# Patient Record
Sex: MALE | Race: BLACK OR AFRICAN AMERICAN | Employment: UNEMPLOYED | URBAN - METROPOLITAN AREA
[De-identification: names, ages, dates, MRNs, and addresses within clinical notes are randomized per-mention and may not be internally consistent; named-entity substitution may affect disease eponyms.]

---

## 2020-05-27 ENCOUNTER — APPOINTMENT (OUTPATIENT)
Dept: GENERAL RADIOLOGY | Age: 65
End: 2020-05-27
Attending: EMERGENCY MEDICINE
Payer: COMMERCIAL

## 2020-05-27 ENCOUNTER — APPOINTMENT (OUTPATIENT)
Dept: CT IMAGING | Age: 65
End: 2020-05-27
Attending: EMERGENCY MEDICINE
Payer: COMMERCIAL

## 2020-05-27 ENCOUNTER — HOSPITAL ENCOUNTER (EMERGENCY)
Age: 65
Discharge: HOME | End: 2020-05-27
Attending: EMERGENCY MEDICINE
Payer: COMMERCIAL

## 2020-05-27 VITALS
HEIGHT: 66 IN | RESPIRATION RATE: 16 BRPM | SYSTOLIC BLOOD PRESSURE: 152 MMHG | WEIGHT: 190 LBS | HEART RATE: 87 BPM | TEMPERATURE: 98.7 F | BODY MASS INDEX: 30.53 KG/M2 | DIASTOLIC BLOOD PRESSURE: 98 MMHG | OXYGEN SATURATION: 96 %
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
## 2020-05-27 DIAGNOSIS — M48.02 CERVICAL SPINAL STENOSIS: ICD-10-CM

## 2020-05-27 DIAGNOSIS — R03.0 ELEVATED BLOOD PRESSURE READING: ICD-10-CM

## 2020-05-27 DIAGNOSIS — M54.50 ACUTE LOW BACK PAIN WITHOUT SCIATICA, UNSPECIFIED BACK PAIN LATERALITY: ICD-10-CM

## 2020-05-27 DIAGNOSIS — M25.512 ACUTE PAIN OF LEFT SHOULDER: ICD-10-CM

## 2020-05-27 DIAGNOSIS — M67.912 TENDINOPATHY OF LEFT ROTATOR CUFF: ICD-10-CM

## 2020-05-27 PROCEDURE — 99284 EMERGENCY DEPT VISIT MOD MDM: CPT

## 2020-05-27 PROCEDURE — 74011250637 HC RX REV CODE- 250/637: Performed by: EMERGENCY MEDICINE

## 2020-05-27 PROCEDURE — 73030 X-RAY EXAM OF SHOULDER: CPT

## 2020-05-27 PROCEDURE — 73010 X-RAY EXAM OF SHOULDER BLADE: CPT

## 2020-05-27 PROCEDURE — 74011250636 HC RX REV CODE- 250/636: Performed by: EMERGENCY MEDICINE

## 2020-05-27 PROCEDURE — 72125 CT NECK SPINE W/O DYE: CPT

## 2020-05-27 PROCEDURE — 96372 THER/PROPH/DIAG INJ SC/IM: CPT

## 2020-05-27 PROCEDURE — 72100 X-RAY EXAM L-S SPINE 2/3 VWS: CPT

## 2020-05-27 RX ORDER — CYCLOBENZAPRINE HCL 10 MG
10 TABLET ORAL
Qty: 20 TAB | Refills: 0 | Status: SHIPPED | OUTPATIENT
Start: 2020-05-27

## 2020-05-27 RX ORDER — NAPROXEN 500 MG/1
500 TABLET ORAL 2 TIMES DAILY WITH MEALS
Qty: 20 TAB | Refills: 0 | Status: SHIPPED | OUTPATIENT
Start: 2020-05-27 | End: 2020-08-31

## 2020-05-27 RX ORDER — OXYCODONE AND ACETAMINOPHEN 5; 325 MG/1; MG/1
2 TABLET ORAL
Status: COMPLETED | OUTPATIENT
Start: 2020-05-27 | End: 2020-05-27

## 2020-05-27 RX ORDER — OXYCODONE AND ACETAMINOPHEN 5; 325 MG/1; MG/1
1 TABLET ORAL
Qty: 10 TAB | Refills: 0 | Status: SHIPPED | OUTPATIENT
Start: 2020-05-27 | End: 2020-06-01

## 2020-05-27 RX ORDER — ATORVASTATIN CALCIUM 40 MG/1
TABLET, FILM COATED ORAL
COMMUNITY
Start: 2020-04-11

## 2020-05-27 RX ORDER — CYCLOBENZAPRINE HCL 10 MG
10 TABLET ORAL
Status: COMPLETED | OUTPATIENT
Start: 2020-05-27 | End: 2020-05-27

## 2020-05-27 RX ORDER — KETOROLAC TROMETHAMINE 30 MG/ML
30 INJECTION, SOLUTION INTRAMUSCULAR; INTRAVENOUS
Status: COMPLETED | OUTPATIENT
Start: 2020-05-27 | End: 2020-05-27

## 2020-05-27 RX ADMIN — KETOROLAC TROMETHAMINE 30 MG: 30 INJECTION, SOLUTION INTRAMUSCULAR at 17:50

## 2020-05-27 RX ADMIN — CYCLOBENZAPRINE HYDROCHLORIDE 10 MG: 10 TABLET, FILM COATED ORAL at 17:50

## 2020-05-27 RX ADMIN — OXYCODONE HYDROCHLORIDE AND ACETAMINOPHEN 2 TABLET: 5; 325 TABLET ORAL at 17:50
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
## 2020-08-31 ENCOUNTER — HOSPITAL ENCOUNTER (OUTPATIENT)
Dept: PREADMISSION TESTING | Age: 65
Discharge: HOME | End: 2020-08-31
Attending: ORTHOPAEDIC SURGERY
Payer: COMMERCIAL

## 2020-08-31 ENCOUNTER — HOSPITAL ENCOUNTER (OUTPATIENT)
Dept: GENERAL RADIOLOGY | Age: 65
Discharge: HOME | End: 2020-08-31
Attending: ORTHOPAEDIC SURGERY
Payer: COMMERCIAL

## 2020-08-31 VITALS
HEIGHT: 65 IN | OXYGEN SATURATION: 97 % | TEMPERATURE: 98.3 F | WEIGHT: 187.83 LBS | BODY MASS INDEX: 31.29 KG/M2 | DIASTOLIC BLOOD PRESSURE: 95 MMHG | HEART RATE: 74 BPM | SYSTOLIC BLOOD PRESSURE: 147 MMHG | RESPIRATION RATE: 16 BRPM

## 2020-08-31 LAB
25(OH)D3 SERPL-MCNC: 19.5 NG/ML (ref 30–100)
ABO + RH BLD: NORMAL
ALBUMIN SERPL-MCNC: 3.9 G/DL (ref 3.5–5)
ALBUMIN/GLOB SERPL: 1.1 {RATIO} (ref 1.1–2.2)
ALP SERPL-CCNC: 86 U/L (ref 45–117)
ALT SERPL-CCNC: 47 U/L (ref 12–78)
ANION GAP SERPL CALC-SCNC: 3 MMOL/L (ref 5–15)
APPEARANCE UR: CLEAR
AST SERPL-CCNC: 44 U/L (ref 15–37)
BACTERIA URNS QL MICRO: NEGATIVE /HPF
BILIRUB SERPL-MCNC: 0.4 MG/DL (ref 0.2–1)
BILIRUB UR QL: NEGATIVE
BLOOD GROUP ANTIBODIES SERPL: NORMAL
BUN SERPL-MCNC: 12 MG/DL (ref 6–20)
BUN/CREAT SERPL: 14 (ref 12–20)
CALCIUM SERPL-MCNC: 9.5 MG/DL (ref 8.5–10.1)
CHLORIDE SERPL-SCNC: 105 MMOL/L (ref 97–108)
CO2 SERPL-SCNC: 30 MMOL/L (ref 21–32)
COLOR UR: NORMAL
CREAT SERPL-MCNC: 0.88 MG/DL (ref 0.7–1.3)
EPITH CASTS URNS QL MICRO: NORMAL /LPF
ERYTHROCYTE [DISTWIDTH] IN BLOOD BY AUTOMATED COUNT: 12.4 % (ref 11.5–14.5)
EST. AVERAGE GLUCOSE BLD GHB EST-MCNC: 117 MG/DL
GLOBULIN SER CALC-MCNC: 3.7 G/DL (ref 2–4)
GLUCOSE SERPL-MCNC: 92 MG/DL (ref 65–100)
GLUCOSE UR STRIP.AUTO-MCNC: NEGATIVE MG/DL
HBA1C MFR BLD: 5.7 % (ref 4–5.6)
HCT VFR BLD AUTO: 43.8 % (ref 36.6–50.3)
HGB BLD-MCNC: 14.3 G/DL (ref 12.1–17)
HGB UR QL STRIP: NEGATIVE
HYALINE CASTS URNS QL MICRO: NORMAL /LPF (ref 0–5)
INR PPP: 0.9 (ref 0.9–1.1)
KETONES UR QL STRIP.AUTO: NEGATIVE MG/DL
LEUKOCYTE ESTERASE UR QL STRIP.AUTO: NEGATIVE
MCH RBC QN AUTO: 30 PG (ref 26–34)
MCHC RBC AUTO-ENTMCNC: 32.6 G/DL (ref 30–36.5)
MCV RBC AUTO: 91.8 FL (ref 80–99)
NITRITE UR QL STRIP.AUTO: NEGATIVE
NRBC # BLD: 0 K/UL (ref 0–0.01)
NRBC BLD-RTO: 0 PER 100 WBC
PH UR STRIP: 6 [PH] (ref 5–8)
PLATELET # BLD AUTO: 330 K/UL (ref 150–400)
PMV BLD AUTO: 10.2 FL (ref 8.9–12.9)
POTASSIUM SERPL-SCNC: 4 MMOL/L (ref 3.5–5.1)
PREALB SERPL-MCNC: 33.8 MG/DL (ref 20–40)
PROT SERPL-MCNC: 7.6 G/DL (ref 6.4–8.2)
PROT UR STRIP-MCNC: NEGATIVE MG/DL
PROTHROMBIN TIME: 9.9 SEC (ref 9–11.1)
RBC # BLD AUTO: 4.77 M/UL (ref 4.1–5.7)
RBC #/AREA URNS HPF: NORMAL /HPF (ref 0–5)
SODIUM SERPL-SCNC: 138 MMOL/L (ref 136–145)
SP GR UR REFRACTOMETRY: 1.02 (ref 1–1.03)
SPECIMEN EXP DATE BLD: NORMAL
UA: UC IF INDICATED,UAUC: NORMAL
UROBILINOGEN UR QL STRIP.AUTO: 1 EU/DL (ref 0.2–1)
WBC # BLD AUTO: 5.7 K/UL (ref 4.1–11.1)
WBC URNS QL MICRO: NORMAL /HPF (ref 0–4)

## 2020-08-31 PROCEDURE — 80053 COMPREHEN METABOLIC PANEL: CPT

## 2020-08-31 PROCEDURE — 36415 COLL VENOUS BLD VENIPUNCTURE: CPT

## 2020-08-31 PROCEDURE — 85027 COMPLETE CBC AUTOMATED: CPT

## 2020-08-31 PROCEDURE — 83036 HEMOGLOBIN GLYCOSYLATED A1C: CPT

## 2020-08-31 PROCEDURE — 71046 X-RAY EXAM CHEST 2 VIEWS: CPT

## 2020-08-31 PROCEDURE — 82306 VITAMIN D 25 HYDROXY: CPT

## 2020-08-31 PROCEDURE — 93005 ELECTROCARDIOGRAM TRACING: CPT

## 2020-08-31 PROCEDURE — 97161 PT EVAL LOW COMPLEX 20 MIN: CPT

## 2020-08-31 PROCEDURE — 85610 PROTHROMBIN TIME: CPT

## 2020-08-31 PROCEDURE — 97116 GAIT TRAINING THERAPY: CPT

## 2020-08-31 PROCEDURE — 81001 URINALYSIS AUTO W/SCOPE: CPT

## 2020-08-31 PROCEDURE — 86900 BLOOD TYPING SEROLOGIC ABO: CPT

## 2020-08-31 PROCEDURE — 84134 ASSAY OF PREALBUMIN: CPT

## 2020-08-31 NOTE — PROGRESS NOTES
[facility]  Physical Therapy Pre-surgery evaluation  [address] [facility] [place], [address] [facility]    PHYSICAL THERAPY PRE SPINE SURGERY EVALUATION  Patient: Severino Davis (67 y.o. male)  Date: 8/31/2020  Primary Diagnosis: PAT  Procedure(s) (LRB):  C3-5 ANTERIOR CERVICAL DISCECTOMY WITH FUSION (CHOICE ANES) (N/A)     Precautions: BLT       ASSESSMENT :  Based on the objective data described below, the patient presents with impaired sensation in B hands and constant neck pain due to end stage degenerative joint disease in the cervical spine. No weakness detected in B UE & LE. Reports only numbness of variable degrees into hands and pain from neck down to mid and lower back intermittently. Discussed anticipated disposition to home with possible discharge within a 1 to 2 day time frame post-surgery. Patient and  in agreement. Anticipate patient will not need acute PT and OT orders based on current deficits post surgery. GOALS: (Goals have been discussed and agreed upon with patient.)  DISCHARGE GOALS: Time Frame: 1 DAY  1. Patient will demonstrate increased strength, range of motion, and pain control via a home exercise program in order to minimize functional deficits in preparation for their upcoming surgery. This will be achieved by using education, demonstration  and through the use of an informational handout including a home exercise program.  REHABILITATION POTENTIAL FOR STATED GOALS: Good     RECOMMENDATIONS AND PLANNED INTERVENTIONS: (Benefits and precautions of physical therapy have been discussed with the patient.)  · Home Exercise Program  TREATMENT PLAN EFFECTIVE DATES: 8/31/2020 to 8/31/2020  FREQUENCY/DURATION: Patient to continue to perform home exercise program at least twice daily until surgery. SUBJECTIVE:   Patient stated my pain is constant in the 1-2s, but increases instantly to 5-7 when I turn my head or look up.     OBJECTIVE DATA SUMMARY: HISTORY:      History reviewed. No pertinent past medical history. Past Surgical History:   Procedure Laterality Date    HX TONSILLECTOMY      5yrs       Prior Level of Function/Home Situation: currently needs intermittent assistance with shoes/socks, otherwise independent. Lives with supportive parents/family. Personal factors and/or comorbidities impacting plan of care:      Home Situation  Home Environment: Private residence  # Steps to Enter: 5  Rails to Enter: Yes  Hand Rails : Bilateral  Wheelchair Ramp: No  One/Two Story Residence: One story  Living Alone: No  Support Systems: Spouse/Significant Other/Partner  Patient Expects to be Discharged to[de-identified] Private residence  Current DME Used/Available at Home: None  Tub or Shower Type: Shower    EXAMINATION/PRESENTATION/DECISION MAKING:     ADLs (Current Functional Status): Bathing/Showering:   [x] Independent  [] Requires Assistance from Someone  [] Sponge Bath Only   Ambulation:  [x] Independent  [x] Walk Indoors Only  [x] Walk Outdoors  [] Use Assistive Device  [] Use Wheelchair Only     Dressing:  [] [address] from Someone for:  [x] Sock/Shoes  [] Pants  [] Everything   Household Activities:  [] Routine house and yard work  [] Light Housework Only  [x] None         Critical Behavior:  Neurologic State: Alert  Orientation Level: Oriented X4          Strength:    Strength:  Within functional limits                        Tone & Sensation:   Tone: Normal              Sensation: Impaired(intermittent numbness/claudication BUE with prolonged neck /)                   Range Of Motion:  AROM: Within functional limits           PROM: Within functional limits           Coordination:  Coordination: Within functional limits    Functional Mobility:  Transfers:  Sit to Stand: Modified independent  Stand to Sit: Modified independent        Bed to Chair: Independent              Balance:   Sitting: Intact  Standing: Intact  Ambulation/Gait Training:  Distance (ft): 150 Feet (ft)  Assistive Device: (none)  Ambulation - Level of Assistance: Independent     Gait Description (WDL): Exceptions to WDL  Gait Abnormalities: (none)  Right Side Weight Bearing: Full  Left Side Weight Bearing: Full        Speed/Elisabeth: Accelerated  Step Length: (normal)                            Functional Measure:  10 Meter walk test:  (Specify if any supplemental oxygen is used, the type, pre, during and post sats.)    Self-Selected Or Fast-Velocity: Self Selected Velocity  Trial 1 (Time to Walk 10 Meters): 8.9 Seconds  Trial 2 (Time to Walk 10 Meters): 9.3 Seconds  Trial 3 (Time to Walk 10 Meters): 8.6 Seconds  Average : 8.9 Seconds  Score (Meters/Second): 1.1 Meters/Second             Walking Speed (m/s)  Modifier Scale Age 52-63 Age 61-76 Age 66-77 Age 80-80    Male Female Male Female Male Female Male Female   CH   0% Impaired ? 1.39 ? 1.40 ? 1.36 ? 1.30 ? 1.33 ? 1.27 ? 1.21 ? 1.15   CI   1-19% Impaired 1.11-1.38 1.12-1.39 1.09-1.35 1.04-1.29 1.06-1.32 1.01-1.26 0.96-1.20 0.92-1.14   CJ   20-39% Impaired 0.83-1.10 0.84-1.11 0.82-1.08 0.78-1.03 0.80-1.05 0.76-1.00 0.72-0.95 0.69-0.91   CK   40-59% Impaired 0.56-0.82 0.57-0.83 0.54-0.81 0.52-0.77 0.53-0.79 0.51-0.75 0.48-0.71 0.46-0.68   CL   60-79% Impaired 0.28-0.55 0.28-0.56 0.27-0.53 0.26-0.51 0.27-0.52 0.25-0.50 0.24-0.49 0.23-0.45   CM   80-99% Impaired 0.01-0.28 < 0.01-0.28 < 0.01-0.27 < 0.01-0.26 0.01-0.27 0.01-0.24 0.01-0.23 0.01-0.22   CN   100% Impaired Cannot Perform   Minimal Detectable Change (MDC-90) = 0.1 m/s  Janay DUNNE \"Comfortable and maximum walking speed of adults aged 20-79 years: reference values and determinants. \" Age and Agin Volume 26(1):15-9. Lor Song. \"Age- and gender-related test performance in community-dwelling elderly people: Six-Minute Walk Test, Roa Balance Scale, Timed Up & Go Test, and gait speeds. \" Physical Therapy: 2002 Volume 82(2):128-37.   Rene EDWARDS, Trisha ALEXANDER, Leisa Barcenas JD, [employer] WANDA. \"Assessing stability and change of four performance measures: a longitudinal study evaluating outcome following total hip and knee arthroplasty. \" [facility] Musculoskeletal Disorders: 2005 Volume 6(3). Ros Salas, PhD; Adalgisa Parada, PhD. Mark Gil Paper: \"Walking Speed: the Sixth Vital Sign\" Journal of Geriatric Physical Therapy: 2009 - Volume 32 - Issue 2 - p 25 . Pain:  Pain Scale 1: Numeric (0 - 10)  Pain Intensity 1: 5  Pain Location 1: Back;Neck     Pain Description 1: Aching       Radicular Pain:   Pain in neck is constant with intermittent radiation into mid back and lower back with neck rotation and extension. Flexion does not increase pain or numbness. Activity Tolerance:   Good. Patient []   does  [x]   does not demonstrate signs/symptoms of shortness of breath/dyspnea on exertion/respiratory distress. COMMUNICATION/EDUCATION:   The patient was educated on:  [x]         Early post operative mobility is imperative to achieve a patient's desired outcomes and to restore biological function. [x]         Post operative spinal precautions may/may not be applicable. These precautions are based on the patient's physician and the procedure(s) performed.     [x]         Spinal precautions including:  ·   No bending forward, sideways, or backwards  ·   No twisting   ·   No lifting more than 5-10 pounds  ·   No sitting longer than 30-60 minutes at a time  ·   Cervical aspen collar should be on at all times    The patients plan of care was discussed as follows:   [x]         The patient verbalized understanding of her plan in preparation for their upcoming surgery  []         The patient's  was present for this session  [x]        The patient reports that he/she does not have a  identified at this time  []         The  verbalized understanding of the education regarding the patient's upcoming surgery  [x]         Patient/family agree to work toward stated goals and plan of care. []         Patient understands intent and goals of therapy, but is neutral about his/her participation. []         Patient is unable to participate in goal setting and plan of care.       Thank you for this referral.  Silvino Hager, PT    Time Calculation: 18 mins

## 2020-08-31 NOTE — PERIOP NOTES

## 2020-08-31 NOTE — PERIOP NOTES
Hibiclens/Chlorhexidine    Preventing Infections Before and After  Your Surgery    IMPORTANT INSTRUCTIONS    Please read and follow these instructions carefully. If you are unable to comply with the below instructions your procedure will be cancelled. Every Night for Three (3) nights before your surgery:  1. Shower with an antibacterial soap, such as Dial, or the soap provided at your preassessment appointment. A shower is better than a bath for cleaning your skin. 2. If needed, ask someone to help you reach all areas of your body. Dont forget to clean your belly button with every shower. The night before your surgery: If you lose your Hibiclens/chlorhexidine please contact surgery center or you can purchase it at a local pharmacy  1. On the night before your surgery, shower with an antibacterial soap, such as Dial, or the soap provided at your preassessment appointment. 2. With one packet of Hibiclens/Chlorhexidine in hand, turn water off.  3. Apply Hibiclens antiseptic skin cleanser with a clean, freshly washed washcloth. ? Gently apply to your body from chin to toes (except the genital area) and especially the area(s) where your incision(s) will be. ? Leave Hibiclens/Chlorhexidine on your skin for at least 20 seconds. CAUTION: If needed, Hibiclens/chlorhexidine may be used to clean the folds of skin of the legs (such as in the area of the groin) and on your buttocks and hips. However, do not use Hibiclens/Chlorhexidine above the neck or in the genital area (your bottom) or put inside any area of your body. 4. Turn the water back on and rinse. 5. Dry gently with a clean, freshly washed towel. 6. After your shower, do not use any powder, deodorant, perfumes or lotion. 7. Use clean, freshly washed towels and washcloths every time you shower. 8. Wear clean, freshly washed pajamas to bed the night before surgery. 9. Sleep on clean, freshly washed sheets.   10. Do not allow pets to sleep in your bed with you. The Morning of your surgery:  1. Shower again thoroughly with an antibacterial soap, such as Dial or the soap provided at your preassessment appointment. If needed, ask someone for help to reach all areas of your body. Dont forget to clean your belly button! Rinse. 2. Dry gently with a clean, freshly washed towel. 3. After your shower, do not use any powder, deodorant, perfumes or lotion prior to surgery. 4. Put on clean, freshly washed clothing. Tips to help prevent infections after your surgery:  1. Protect your surgical wound from germs:  ? Hand washing is the most important thing you and your caregivers can do to prevent infections. ? Keep your bandage clean and dry! ? Do not touch your surgical wound. 2. Use clean, freshly washed towels and washcloths every time you shower; do not share bath linens with others. 3. Until your surgical wound is healed, wear clothing and sleep on bed linens each day that are clean and freshly washed. 4. Do not allow pets to sleep in your bed with you or touch your surgical wound. 5. Do not smoke  smoking delays wound healing. This may be a good time to stop smoking. 6. If you have diabetes, it is important for you to manage your blood sugar levels properly before your surgery as well as after your surgery. Poorly managed blood sugar levels slow down wound healing and prevent you from healing completely. If you lose your Hibiclens/chlorhexidine, please call the [facility], or it is available for purchase at your pharmacy.                ___________________      ___________________      ________________  (Signature of Patient)          (Witness)                   (Date and Time)

## 2020-08-31 NOTE — PERIOP NOTES
Incentive Spirometer        Using the incentive spirometer helps expand the small air sacs of your lungs, helps you breathe deeply, and helps improve your lung function. Use your incentive spirometer twice a day (10 breaths each time) prior to surgery. How to Use Your Incentive Spirometer:  1. Hold the incentive spirometer in an upright position. 2. Breathe out as usual.   3. Place the mouthpiece in your mouth and seal your lips tightly around it. 4. Take a deep breath. Breathe in slowly and as deeply as possible. Keep the blue flow rate guide between the arrows. 5. Hold your breath as long as possible. Then exhale slowly and allow the piston to fall to the bottom of the column. 6. Rest for a few seconds and repeat steps one through five at least 10 times. PAT Tidal Volume_____2000________  x______2________  Date______8/31/2020_________    [place] THE INCENTIVE SPIROMETER WITH YOU TO THE HOSPITAL ON THE DAY OF YOUR SURGERY. Opportunity given to ask and answer questions as well as to observe return demonstration.     Patient signature_____________________________    Witness____________________________

## 2020-08-31 NOTE — PERIOP NOTES
[facility]  Joint/Spine Preoperative Instructions        Surgery Date 9/8/2020          Time of Arrival 0700am  Contact# [phone]    1. On the day of your surgery, please report to the Surgical Services Registration Desk and sign in at your designated time. The Surgery Center is located to the right of the Emergency Room. 2. You must have someone with you to drive you home. You should not drive a car for 24 hours following surgery. Please make arrangements for a friend or family member to stay with you for the first 24 hours after your surgery. 3. No food after midnight 9/7/2020. Medications morning of surgery should be taken with a sip of water. Please follow pre-surgery drink instructions that were given at your Pre Admission Testing appointment. 4. We recommend you do not drink any alcoholic beverages for 24 hours before and after your surgery. 5. Contact your surgeons office for instructions on the following medications: non-steroidal anti-inflammatory drugs (i.e. Advil, Aleve), vitamins, and supplements. (Some surgeons will want you to stop these medications prior to surgery and others may allow you to take them)  **If you are currently taking Plavix, Coumadin, Aspirin and/or other blood-thinning agents, contact your surgeon for instructions. ** Your surgeon will partner with the physician prescribing these medications to determine if it is safe to stop or if you need to continue taking. Please do not stop taking these medications without instructions from your surgeon    6. Wear comfortable clothes. Wear glasses instead of contacts. Do not bring any money or jewelry. Please bring picture ID, insurance card, and any prearranged co-payment or hospital payment. Do not wear make-up, particularly mascara the morning of your surgery. Do not wear nail polish, particularly if you are having foot /hand surgery.   Wear your hair loose or down, no ponytails, buns, wanda pins or clips. All body piercings must be removed. Please shower with antibacterial soap for three consecutive days before and on the morning of surgery, but do not apply any lotions, powders or deodorants after the shower on the day of surgery. Please use a fresh towels after each shower. Please sleep in clean clothes and change bed linens the night before surgery. Please do not shave for 48 hours prior to surgery. Shaving of the face is acceptable. 7. You should understand that if you do not follow these instructions your surgery may be cancelled. If your physical condition changes (I.e. fever, cold or flu) please contact your surgeon as soon as possible. 8. It is important that you be on time. If a situation occurs where you may be late, please call [phone] (OR Holding Area). 9. If you have any questions and or problems, please call [phone] (Pre-admission Testing). 10. Your surgery time may be subject to change. You will receive a phone call the evening prior if your time changes. 11.  If having outpatient surgery, you must have someone to drive you here, stay with you during the duration of your stay, and to drive you home at time of discharge. 12. The following link is for the educational video for patients and/or families. [email or web address][email or web address]/[email or web address]-frbdfgc-qglahta/[place]/afcguuci-hwywlwib-wzybfdy-[place]/educational-materials    Special Instructions: Follow all instructions given to you by your doctor. Use your incentive spirometer everyday and bring with you to the hospital.  Read and review your Ortho/Spine book and bring with you to the hospital.    Covid Testing 9/4/2020 arrive between 7am - 10am MOB 1 - we recommend you self quarantine from time of testing til day of surgery.       TAKE ALL MEDICATIONS THE DAY OF SURGERY EXCEPT: all vitamins and supplements      I understand a pre-operative phone call will be made to verify my surgery time. In the event that I am not available, I give permission for a message to be left on my answering service and/or with another person?   yes         ___________________      __________   _________    (Signature of Patient)             (Witness)                (Date and Time)

## 2020-08-31 NOTE — PERIOP NOTES
PAT appointment with patient - reviewed all instructions and teaching with no further questions. Ortho/Spine book given to patient and reviewed with all questions answered. Covid Testing 9/4 @8312 - recommended to quarantine from time of testing til DOS    Call made to Ketty Coats - Dr. Mariola Merchant' office - made her aware of patients elevated BP - pt denies HA, dizziness or visual changes.   Clearance appt set with PCP 9/3/20

## 2020-09-01 LAB
ATRIAL RATE: 70 BPM
BACTERIA SPEC CULT: NORMAL
BACTERIA SPEC CULT: NORMAL
CALCULATED P AXIS, ECG09: 55 DEGREES
CALCULATED R AXIS, ECG10: 1 DEGREES
CALCULATED T AXIS, ECG11: 19 DEGREES
DIAGNOSIS, 93000: NORMAL
P-R INTERVAL, ECG05: 162 MS
Q-T INTERVAL, ECG07: 386 MS
QRS DURATION, ECG06: 98 MS
QTC CALCULATION (BEZET), ECG08: 416 MS
SERVICE CMNT-IMP: NORMAL
VENTRICULAR RATE, ECG03: 70 BPM
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
## 2020-09-04 ENCOUNTER — HOSPITAL ENCOUNTER (OUTPATIENT)
Dept: PREADMISSION TESTING | Age: 65
Discharge: HOME | End: 2020-09-04
Payer: COMMERCIAL

## 2020-09-04 PROCEDURE — 87635 SARS-COV-2 COVID-19 AMP PRB: CPT

## 2020-09-05 LAB
HEALTH STATUS, XMCV2T: NORMAL
SARS-COV-2, COV2NT: NOT DETECTED
SOURCE, COVRS: NORMAL
SPECIMEN SOURCE, FCOV2M: NORMAL
SPECIMEN TYPE, XMCV1T: NORMAL

## 2020-09-08 ENCOUNTER — APPOINTMENT (OUTPATIENT)
Dept: GENERAL RADIOLOGY | Age: 65
End: 2020-09-08
Attending: ORTHOPAEDIC SURGERY
Payer: COMMERCIAL

## 2020-09-08 ENCOUNTER — HOSPITAL ENCOUNTER (OUTPATIENT)
Age: 65
Setting detail: OBSERVATION
Discharge: HOME | End: 2020-09-09
Attending: ORTHOPAEDIC SURGERY | Admitting: ORTHOPAEDIC SURGERY
Payer: COMMERCIAL

## 2020-09-08 ENCOUNTER — ANESTHESIA EVENT (OUTPATIENT)
Dept: SURGERY | Age: 65
End: 2020-09-08
Payer: COMMERCIAL

## 2020-09-08 ENCOUNTER — ANESTHESIA (OUTPATIENT)
Dept: SURGERY | Age: 65
End: 2020-09-08
Payer: COMMERCIAL

## 2020-09-08 DIAGNOSIS — M48.02 CERVICAL STENOSIS OF SPINAL CANAL: Primary | ICD-10-CM

## 2020-09-08 PROBLEM — M48.062 SPINAL STENOSIS OF LUMBAR REGION WITH NEUROGENIC CLAUDICATION: Status: ACTIVE | Noted: 2020-09-08

## 2020-09-08 PROCEDURE — 77030012407 HC DRN WND BARD -B: Performed by: ORTHOPAEDIC SURGERY

## 2020-09-08 PROCEDURE — 77030019908 HC STETH ESOPH SIMS -A: Performed by: NURSE ANESTHETIST, CERTIFIED REGISTERED

## 2020-09-08 PROCEDURE — 74011000250 HC RX REV CODE- 250: Performed by: NURSE ANESTHETIST, CERTIFIED REGISTERED

## 2020-09-08 PROCEDURE — 77030020268 HC MISC GENERAL SUPPLY: Performed by: ORTHOPAEDIC SURGERY

## 2020-09-08 PROCEDURE — 77030003029 HC SUT VCRL J&J -B: Performed by: ORTHOPAEDIC SURGERY

## 2020-09-08 PROCEDURE — 77030013567 HC DRN WND RESERV BARD -A: Performed by: ORTHOPAEDIC SURGERY

## 2020-09-08 PROCEDURE — 77030029099 HC BN WAX SSPC -A: Performed by: ORTHOPAEDIC SURGERY

## 2020-09-08 PROCEDURE — 74011250637 HC RX REV CODE- 250/637: Performed by: ORTHOPAEDIC SURGERY

## 2020-09-08 PROCEDURE — 76000 FLUOROSCOPY <1 HR PHYS/QHP: CPT

## 2020-09-08 PROCEDURE — 77030034479 HC ADH SKN CLSR PRINEO J&J -B: Performed by: ORTHOPAEDIC SURGERY

## 2020-09-08 PROCEDURE — 74011000250 HC RX REV CODE- 250: Performed by: ORTHOPAEDIC SURGERY

## 2020-09-08 PROCEDURE — 77030040356 HC CORD BPLR FRCP COVD -A: Performed by: ORTHOPAEDIC SURGERY

## 2020-09-08 PROCEDURE — C1713 ANCHOR/SCREW BN/BN,TIS/BN: HCPCS | Performed by: ORTHOPAEDIC SURGERY

## 2020-09-08 PROCEDURE — 77030021678 HC GLIDESCP STAT DISP VERT -B: Performed by: NURSE ANESTHETIST, CERTIFIED REGISTERED

## 2020-09-08 PROCEDURE — 77030008462 HC STPLR SKN PROX J&J -A: Performed by: ORTHOPAEDIC SURGERY

## 2020-09-08 PROCEDURE — 97116 GAIT TRAINING THERAPY: CPT | Performed by: PHYSICAL THERAPIST

## 2020-09-08 PROCEDURE — 99218 HC RM OBSERVATION: CPT

## 2020-09-08 PROCEDURE — 77030041680 HC PNCL ELECSURG SMK EVAC CNMD -B: Performed by: ORTHOPAEDIC SURGERY

## 2020-09-08 PROCEDURE — 77030033138 HC SUT PGA STRATFX J&J -B: Performed by: ORTHOPAEDIC SURGERY

## 2020-09-08 PROCEDURE — 77030002996 HC SUT SLK J&J -A: Performed by: ORTHOPAEDIC SURGERY

## 2020-09-08 PROCEDURE — 72040 X-RAY EXAM NECK SPINE 2-3 VW: CPT

## 2020-09-08 PROCEDURE — 94760 N-INVAS EAR/PLS OXIMETRY 1: CPT

## 2020-09-08 PROCEDURE — 74011250636 HC RX REV CODE- 250/636: Performed by: ORTHOPAEDIC SURGERY

## 2020-09-08 PROCEDURE — 77030018836 HC SOL IRR NACL ICUM -A: Performed by: ORTHOPAEDIC SURGERY

## 2020-09-08 PROCEDURE — 74011250636 HC RX REV CODE- 250/636: Performed by: ANESTHESIOLOGY

## 2020-09-08 PROCEDURE — 77030038692 HC WND DEB SYS IRMX -B: Performed by: ORTHOPAEDIC SURGERY

## 2020-09-08 PROCEDURE — 77030011267 HC ELECTRD BLD COVD -A: Performed by: ORTHOPAEDIC SURGERY

## 2020-09-08 PROCEDURE — 77030008684 HC TU ET CUF COVD -B: Performed by: NURSE ANESTHETIST, CERTIFIED REGISTERED

## 2020-09-08 PROCEDURE — 77030018846 HC SOL IRR STRL H20 ICUM -A: Performed by: ORTHOPAEDIC SURGERY

## 2020-09-08 PROCEDURE — 76060000035 HC ANESTHESIA 2 TO 2.5 HR: Performed by: ORTHOPAEDIC SURGERY

## 2020-09-08 PROCEDURE — 77030040361 HC SLV COMPR DVT MDII -B: Performed by: ORTHOPAEDIC SURGERY

## 2020-09-08 PROCEDURE — 74011250636 HC RX REV CODE- 250/636: Performed by: NURSE ANESTHETIST, CERTIFIED REGISTERED

## 2020-09-08 PROCEDURE — 77030011266 HC ELECTRD BLD INSL COVD -A: Performed by: ORTHOPAEDIC SURGERY

## 2020-09-08 PROCEDURE — 77030041248 HC GRFT BN OSTEOAMP BTUS -G: Performed by: ORTHOPAEDIC SURGERY

## 2020-09-08 PROCEDURE — 77030014650 HC SEAL MTRX FLOSEL BAXT -C: Performed by: ORTHOPAEDIC SURGERY

## 2020-09-08 PROCEDURE — 77030009868 HC PIN DISTR CASPR AESC -B: Performed by: ORTHOPAEDIC SURGERY

## 2020-09-08 PROCEDURE — 77010033678 HC OXYGEN DAILY

## 2020-09-08 PROCEDURE — 97161 PT EVAL LOW COMPLEX 20 MIN: CPT | Performed by: PHYSICAL THERAPIST

## 2020-09-08 PROCEDURE — 76210000016 HC OR PH I REC 1 TO 1.5 HR: Performed by: ORTHOPAEDIC SURGERY

## 2020-09-08 PROCEDURE — 77030004402 HC BUR NEUR STRY -C: Performed by: ORTHOPAEDIC SURGERY

## 2020-09-08 PROCEDURE — 77030034475 HC MISC IMPL SPN: Performed by: ORTHOPAEDIC SURGERY

## 2020-09-08 PROCEDURE — 77030003028 HC SUT VCRL J&J -A: Performed by: ORTHOPAEDIC SURGERY

## 2020-09-08 PROCEDURE — 76010000171 HC OR TIME 2 TO 2.5 HR INTENSV-TIER 1: Performed by: ORTHOPAEDIC SURGERY

## 2020-09-08 DEVICE — NANO FLAT CERVICAL 14MM X 16 MM X 6MM X 6 DEGREE
Type: IMPLANTABLE DEVICE | Site: SPINE CERVICAL | Status: FUNCTIONAL
Brand: NANO FORTICORE CERVICAL

## 2020-09-08 DEVICE — FORTIBRIDGE® VARIABLE SCREW 4.5X16MM
Type: IMPLANTABLE DEVICE | Site: SPINE CERVICAL | Status: FUNCTIONAL
Brand: FORTIBRIDGE®

## 2020-09-08 DEVICE — GRAFT BONE 2.5 CC OSTEOAMP: Type: IMPLANTABLE DEVICE | Site: SPINE CERVICAL | Status: FUNCTIONAL

## 2020-09-08 RX ORDER — FENTANYL CITRATE 50 UG/ML
25 INJECTION, SOLUTION INTRAMUSCULAR; INTRAVENOUS
Status: DISCONTINUED | OUTPATIENT
Start: 2020-09-08 | End: 2020-09-08 | Stop reason: HOSPADM

## 2020-09-08 RX ORDER — ACETAMINOPHEN 500 MG
1000 TABLET ORAL ONCE
Status: COMPLETED | OUTPATIENT
Start: 2020-09-08 | End: 2020-09-08

## 2020-09-08 RX ORDER — PREGABALIN 75 MG/1
150 CAPSULE ORAL ONCE
Status: COMPLETED | OUTPATIENT
Start: 2020-09-08 | End: 2020-09-08

## 2020-09-08 RX ORDER — ROCURONIUM BROMIDE 10 MG/ML
INJECTION, SOLUTION INTRAVENOUS AS NEEDED
Status: DISCONTINUED | OUTPATIENT
Start: 2020-09-08 | End: 2020-09-08 | Stop reason: HOSPADM

## 2020-09-08 RX ORDER — FENTANYL CITRATE 50 UG/ML
INJECTION, SOLUTION INTRAMUSCULAR; INTRAVENOUS AS NEEDED
Status: DISCONTINUED | OUTPATIENT
Start: 2020-09-08 | End: 2020-09-08 | Stop reason: HOSPADM

## 2020-09-08 RX ORDER — SODIUM CHLORIDE 0.9 % (FLUSH) 0.9 %
5-40 SYRINGE (ML) INJECTION AS NEEDED
Status: DISCONTINUED | OUTPATIENT
Start: 2020-09-08 | End: 2020-09-08 | Stop reason: HOSPADM

## 2020-09-08 RX ORDER — ATORVASTATIN CALCIUM 40 MG/1
40 TABLET, FILM COATED ORAL
Status: DISCONTINUED | OUTPATIENT
Start: 2020-09-08 | End: 2020-09-09 | Stop reason: HOSPADM

## 2020-09-08 RX ORDER — SODIUM CHLORIDE 9 MG/ML
125 INJECTION, SOLUTION INTRAVENOUS CONTINUOUS
Status: DISPENSED | OUTPATIENT
Start: 2020-09-08 | End: 2020-09-09

## 2020-09-08 RX ORDER — SODIUM CHLORIDE 0.9 % (FLUSH) 0.9 %
5-40 SYRINGE (ML) INJECTION AS NEEDED
Status: DISCONTINUED | OUTPATIENT
Start: 2020-09-08 | End: 2020-09-09 | Stop reason: HOSPADM

## 2020-09-08 RX ORDER — HYDROMORPHONE HYDROCHLORIDE 2 MG/ML
0.2 INJECTION, SOLUTION INTRAMUSCULAR; INTRAVENOUS; SUBCUTANEOUS
Status: DISCONTINUED | OUTPATIENT
Start: 2020-09-08 | End: 2020-09-08 | Stop reason: HOSPADM

## 2020-09-08 RX ORDER — ONDANSETRON 2 MG/ML
INJECTION INTRAMUSCULAR; INTRAVENOUS AS NEEDED
Status: DISCONTINUED | OUTPATIENT
Start: 2020-09-08 | End: 2020-09-08 | Stop reason: HOSPADM

## 2020-09-08 RX ORDER — LIDOCAINE HYDROCHLORIDE 20 MG/ML
INJECTION, SOLUTION EPIDURAL; INFILTRATION; INTRACAUDAL; PERINEURAL AS NEEDED
Status: DISCONTINUED | OUTPATIENT
Start: 2020-09-08 | End: 2020-09-08 | Stop reason: HOSPADM

## 2020-09-08 RX ORDER — NEOSTIGMINE METHYLSULFATE 1 MG/ML
INJECTION, SOLUTION INTRAVENOUS AS NEEDED
Status: DISCONTINUED | OUTPATIENT
Start: 2020-09-08 | End: 2020-09-08 | Stop reason: HOSPADM

## 2020-09-08 RX ORDER — PHENYLEPHRINE HCL IN 0.9% NACL 0.4MG/10ML
SYRINGE (ML) INTRAVENOUS AS NEEDED
Status: DISCONTINUED | OUTPATIENT
Start: 2020-09-08 | End: 2020-09-08 | Stop reason: HOSPADM

## 2020-09-08 RX ORDER — GABAPENTIN 100 MG/1
100 CAPSULE ORAL 3 TIMES DAILY
Status: DISCONTINUED | OUTPATIENT
Start: 2020-09-08 | End: 2020-09-09 | Stop reason: HOSPADM

## 2020-09-08 RX ORDER — DIAZEPAM 5 MG/1
5 TABLET ORAL
Status: DISCONTINUED | OUTPATIENT
Start: 2020-09-08 | End: 2020-09-09 | Stop reason: HOSPADM

## 2020-09-08 RX ORDER — NALOXONE HYDROCHLORIDE 0.4 MG/ML
0.4 INJECTION, SOLUTION INTRAMUSCULAR; INTRAVENOUS; SUBCUTANEOUS AS NEEDED
Status: DISCONTINUED | OUTPATIENT
Start: 2020-09-08 | End: 2020-09-09 | Stop reason: HOSPADM

## 2020-09-08 RX ORDER — OXYCODONE HYDROCHLORIDE 5 MG/1
10 TABLET ORAL
Status: DISCONTINUED | OUTPATIENT
Start: 2020-09-08 | End: 2020-09-09 | Stop reason: HOSPADM

## 2020-09-08 RX ORDER — ONDANSETRON 2 MG/ML
4 INJECTION INTRAMUSCULAR; INTRAVENOUS
Status: DISPENSED | OUTPATIENT
Start: 2020-09-08 | End: 2020-09-09

## 2020-09-08 RX ORDER — SODIUM CHLORIDE 0.9 % (FLUSH) 0.9 %
5-40 SYRINGE (ML) INJECTION EVERY 8 HOURS
Status: DISCONTINUED | OUTPATIENT
Start: 2020-09-08 | End: 2020-09-09 | Stop reason: HOSPADM

## 2020-09-08 RX ORDER — POLYETHYLENE GLYCOL 3350 17 G/17G
17 POWDER, FOR SOLUTION ORAL DAILY
Status: DISCONTINUED | OUTPATIENT
Start: 2020-09-08 | End: 2020-09-09 | Stop reason: HOSPADM

## 2020-09-08 RX ORDER — SODIUM CHLORIDE 0.9 % (FLUSH) 0.9 %
5-40 SYRINGE (ML) INJECTION EVERY 8 HOURS
Status: DISCONTINUED | OUTPATIENT
Start: 2020-09-08 | End: 2020-09-08 | Stop reason: HOSPADM

## 2020-09-08 RX ORDER — FAMOTIDINE 20 MG/1
20 TABLET, FILM COATED ORAL 2 TIMES DAILY
Status: DISCONTINUED | OUTPATIENT
Start: 2020-09-08 | End: 2020-09-09 | Stop reason: HOSPADM

## 2020-09-08 RX ORDER — AMOXICILLIN 250 MG
1 CAPSULE ORAL 2 TIMES DAILY
Status: DISCONTINUED | OUTPATIENT
Start: 2020-09-08 | End: 2020-09-09 | Stop reason: HOSPADM

## 2020-09-08 RX ORDER — ACETAMINOPHEN 500 MG
1000 TABLET ORAL ONCE
Status: DISCONTINUED | OUTPATIENT
Start: 2020-10-08 | End: 2020-09-08

## 2020-09-08 RX ORDER — CYCLOBENZAPRINE HCL 10 MG
10 TABLET ORAL
Status: DISCONTINUED | OUTPATIENT
Start: 2020-09-08 | End: 2020-09-09 | Stop reason: HOSPADM

## 2020-09-08 RX ORDER — SODIUM CHLORIDE, SODIUM LACTATE, POTASSIUM CHLORIDE, CALCIUM CHLORIDE 600; 310; 30; 20 MG/100ML; MG/100ML; MG/100ML; MG/100ML
25 INJECTION, SOLUTION INTRAVENOUS ONCE
Status: COMPLETED | OUTPATIENT
Start: 2020-10-08 | End: 2020-09-08

## 2020-09-08 RX ORDER — SODIUM CHLORIDE, SODIUM LACTATE, POTASSIUM CHLORIDE, CALCIUM CHLORIDE 600; 310; 30; 20 MG/100ML; MG/100ML; MG/100ML; MG/100ML
25 INJECTION, SOLUTION INTRAVENOUS CONTINUOUS
Status: DISCONTINUED | OUTPATIENT
Start: 2020-09-08 | End: 2020-09-08 | Stop reason: HOSPADM

## 2020-09-08 RX ORDER — HYDROMORPHONE HYDROCHLORIDE 1 MG/ML
1 INJECTION, SOLUTION INTRAMUSCULAR; INTRAVENOUS; SUBCUTANEOUS
Status: DISPENSED | OUTPATIENT
Start: 2020-09-08 | End: 2020-09-09

## 2020-09-08 RX ORDER — FACIAL-BODY WIPES
10 EACH TOPICAL DAILY PRN
Status: DISCONTINUED | OUTPATIENT
Start: 2020-09-10 | End: 2020-09-09 | Stop reason: HOSPADM

## 2020-09-08 RX ORDER — ACETAMINOPHEN 500 MG
1000 TABLET ORAL EVERY 6 HOURS
Status: DISCONTINUED | OUTPATIENT
Start: 2020-09-08 | End: 2020-09-09 | Stop reason: HOSPADM

## 2020-09-08 RX ORDER — GLYCOPYRROLATE 0.2 MG/ML
INJECTION INTRAMUSCULAR; INTRAVENOUS AS NEEDED
Status: DISCONTINUED | OUTPATIENT
Start: 2020-09-08 | End: 2020-09-08 | Stop reason: HOSPADM

## 2020-09-08 RX ORDER — PREGABALIN 75 MG/1
150 CAPSULE ORAL ONCE
Status: DISCONTINUED | OUTPATIENT
Start: 2020-10-08 | End: 2020-09-08

## 2020-09-08 RX ORDER — MIDAZOLAM HYDROCHLORIDE 1 MG/ML
INJECTION, SOLUTION INTRAMUSCULAR; INTRAVENOUS AS NEEDED
Status: DISCONTINUED | OUTPATIENT
Start: 2020-09-08 | End: 2020-09-08 | Stop reason: HOSPADM

## 2020-09-08 RX ORDER — DIPHENHYDRAMINE HYDROCHLORIDE 50 MG/ML
12.5 INJECTION, SOLUTION INTRAMUSCULAR; INTRAVENOUS AS NEEDED
Status: DISCONTINUED | OUTPATIENT
Start: 2020-09-08 | End: 2020-09-08 | Stop reason: HOSPADM

## 2020-09-08 RX ORDER — EPHEDRINE SULFATE/0.9% NACL/PF 50 MG/5 ML
SYRINGE (ML) INTRAVENOUS AS NEEDED
Status: DISCONTINUED | OUTPATIENT
Start: 2020-09-08 | End: 2020-09-08 | Stop reason: HOSPADM

## 2020-09-08 RX ORDER — HYDROMORPHONE HYDROCHLORIDE 2 MG/ML
INJECTION, SOLUTION INTRAMUSCULAR; INTRAVENOUS; SUBCUTANEOUS AS NEEDED
Status: DISCONTINUED | OUTPATIENT
Start: 2020-09-08 | End: 2020-09-08 | Stop reason: HOSPADM

## 2020-09-08 RX ORDER — DEXAMETHASONE SODIUM PHOSPHATE 4 MG/ML
INJECTION, SOLUTION INTRA-ARTICULAR; INTRALESIONAL; INTRAMUSCULAR; INTRAVENOUS; SOFT TISSUE AS NEEDED
Status: DISCONTINUED | OUTPATIENT
Start: 2020-09-08 | End: 2020-09-08 | Stop reason: HOSPADM

## 2020-09-08 RX ORDER — HYDROXYZINE HYDROCHLORIDE 10 MG/1
10 TABLET, FILM COATED ORAL
Status: DISCONTINUED | OUTPATIENT
Start: 2020-09-08 | End: 2020-09-09 | Stop reason: HOSPADM

## 2020-09-08 RX ORDER — OXYCODONE HYDROCHLORIDE 5 MG/1
5 TABLET ORAL
Status: DISCONTINUED | OUTPATIENT
Start: 2020-09-08 | End: 2020-09-09 | Stop reason: HOSPADM

## 2020-09-08 RX ORDER — PROPOFOL 10 MG/ML
INJECTION, EMULSION INTRAVENOUS AS NEEDED
Status: DISCONTINUED | OUTPATIENT
Start: 2020-09-08 | End: 2020-09-08 | Stop reason: HOSPADM

## 2020-09-08 RX ORDER — LIDOCAINE HYDROCHLORIDE 10 MG/ML
0.1 INJECTION, SOLUTION EPIDURAL; INFILTRATION; INTRACAUDAL; PERINEURAL AS NEEDED
Status: DISCONTINUED | OUTPATIENT
Start: 2020-09-08 | End: 2020-09-08 | Stop reason: HOSPADM

## 2020-09-08 RX ADMIN — ATORVASTATIN CALCIUM 40 MG: 40 TABLET, FILM COATED ORAL at 22:21

## 2020-09-08 RX ADMIN — FAMOTIDINE 20 MG: 20 TABLET, FILM COATED ORAL at 12:23

## 2020-09-08 RX ADMIN — Medication 3 AMPULE: at 07:24

## 2020-09-08 RX ADMIN — FENTANYL CITRATE 25 MCG: 50 INJECTION, SOLUTION INTRAMUSCULAR; INTRAVENOUS at 10:45

## 2020-09-08 RX ADMIN — MIDAZOLAM HYDROCHLORIDE 2 MG: 1 INJECTION, SOLUTION INTRAMUSCULAR; INTRAVENOUS at 07:35

## 2020-09-08 RX ADMIN — ROCURONIUM BROMIDE 30 MG: 10 INJECTION INTRAVENOUS at 07:55

## 2020-09-08 RX ADMIN — HYDROMORPHONE HYDROCHLORIDE 0.4 MG: 2 INJECTION, SOLUTION INTRAMUSCULAR; INTRAVENOUS; SUBCUTANEOUS at 09:15

## 2020-09-08 RX ADMIN — HYDROMORPHONE HYDROCHLORIDE 1 MG: 1 INJECTION, SOLUTION INTRAMUSCULAR; INTRAVENOUS; SUBCUTANEOUS at 22:21

## 2020-09-08 RX ADMIN — FENTANYL CITRATE 50 MCG: 50 INJECTION, SOLUTION INTRAMUSCULAR; INTRAVENOUS at 07:40

## 2020-09-08 RX ADMIN — SODIUM CHLORIDE 125 ML/HR: 900 INJECTION, SOLUTION INTRAVENOUS at 17:33

## 2020-09-08 RX ADMIN — SODIUM CHLORIDE, SODIUM LACTATE, POTASSIUM CHLORIDE, AND CALCIUM CHLORIDE 25 ML/HR: 600; 310; 30; 20 INJECTION, SOLUTION INTRAVENOUS at 07:24

## 2020-09-08 RX ADMIN — LIDOCAINE HYDROCHLORIDE 100 MG: 20 INJECTION, SOLUTION INTRAVENOUS at 07:40

## 2020-09-08 RX ADMIN — Medication 10 MG: at 08:41

## 2020-09-08 RX ADMIN — FENTANYL CITRATE 50 MCG: 50 INJECTION, SOLUTION INTRAMUSCULAR; INTRAVENOUS at 08:19

## 2020-09-08 RX ADMIN — OXYCODONE HYDROCHLORIDE 10 MG: 5 TABLET ORAL at 20:09

## 2020-09-08 RX ADMIN — ROCURONIUM BROMIDE 10 MG: 10 INJECTION INTRAVENOUS at 07:40

## 2020-09-08 RX ADMIN — Medication 3 MG: at 09:30

## 2020-09-08 RX ADMIN — FENTANYL CITRATE 25 MCG: 50 INJECTION, SOLUTION INTRAMUSCULAR; INTRAVENOUS at 10:31

## 2020-09-08 RX ADMIN — DOCUSATE SODIUM 50MG AND SENNOSIDES 8.6MG 1 TABLET: 8.6; 5 TABLET, FILM COATED ORAL at 12:23

## 2020-09-08 RX ADMIN — DEXAMETHASONE SODIUM PHOSPHATE 8 MG: 4 INJECTION, SOLUTION INTRAMUSCULAR; INTRAVENOUS at 08:05

## 2020-09-08 RX ADMIN — ACETAMINOPHEN 1000 MG: 500 TABLET ORAL at 07:22

## 2020-09-08 RX ADMIN — ACETAMINOPHEN 1000 MG: 500 TABLET ORAL at 12:23

## 2020-09-08 RX ADMIN — Medication 10 ML: at 13:52

## 2020-09-08 RX ADMIN — CEFAZOLIN SODIUM 2 G: 1 INJECTION, POWDER, FOR SOLUTION INTRAMUSCULAR; INTRAVENOUS at 23:26

## 2020-09-08 RX ADMIN — PROPOFOL 200 MG: 10 INJECTION, EMULSION INTRAVENOUS at 07:40

## 2020-09-08 RX ADMIN — GABAPENTIN 100 MG: 100 CAPSULE ORAL at 15:17

## 2020-09-08 RX ADMIN — OXYCODONE HYDROCHLORIDE 10 MG: 5 TABLET ORAL at 12:23

## 2020-09-08 RX ADMIN — SODIUM CHLORIDE 125 ML/HR: 900 INJECTION, SOLUTION INTRAVENOUS at 10:47

## 2020-09-08 RX ADMIN — Medication 40 MCG: at 08:38

## 2020-09-08 RX ADMIN — FENTANYL CITRATE 25 MCG: 50 INJECTION, SOLUTION INTRAMUSCULAR; INTRAVENOUS at 10:37

## 2020-09-08 RX ADMIN — SODIUM CHLORIDE, POTASSIUM CHLORIDE, SODIUM LACTATE AND CALCIUM CHLORIDE: 600; 310; 30; 20 INJECTION, SOLUTION INTRAVENOUS at 07:00

## 2020-09-08 RX ADMIN — ONDANSETRON 4 MG: 2 INJECTION INTRAMUSCULAR; INTRAVENOUS at 20:09

## 2020-09-08 RX ADMIN — GABAPENTIN 100 MG: 100 CAPSULE ORAL at 22:21

## 2020-09-08 RX ADMIN — Medication 40 MCG: at 08:44

## 2020-09-08 RX ADMIN — PHENOL 1 SPRAY: 1.5 LIQUID ORAL at 12:24

## 2020-09-08 RX ADMIN — DOCUSATE SODIUM 50MG AND SENNOSIDES 8.6MG 1 TABLET: 8.6; 5 TABLET, FILM COATED ORAL at 17:38

## 2020-09-08 RX ADMIN — FENTANYL CITRATE 25 MCG: 50 INJECTION, SOLUTION INTRAMUSCULAR; INTRAVENOUS at 10:10

## 2020-09-08 RX ADMIN — OXYCODONE HYDROCHLORIDE 10 MG: 5 TABLET ORAL at 15:18

## 2020-09-08 RX ADMIN — ACETAMINOPHEN 1000 MG: 500 TABLET ORAL at 17:38

## 2020-09-08 RX ADMIN — PROCHLORPERAZINE EDISYLATE 5 MG: 5 INJECTION INTRAMUSCULAR; INTRAVENOUS at 23:38

## 2020-09-08 RX ADMIN — HYDROMORPHONE HYDROCHLORIDE 0.4 MG: 2 INJECTION, SOLUTION INTRAMUSCULAR; INTRAVENOUS; SUBCUTANEOUS at 08:32

## 2020-09-08 RX ADMIN — PREGABALIN 150 MG: 75 CAPSULE ORAL at 07:22

## 2020-09-08 RX ADMIN — SODIUM CHLORIDE, POTASSIUM CHLORIDE, SODIUM LACTATE AND CALCIUM CHLORIDE: 600; 310; 30; 20 INJECTION, SOLUTION INTRAVENOUS at 09:25

## 2020-09-08 RX ADMIN — HYDROMORPHONE HYDROCHLORIDE 0.2 MG: 2 INJECTION, SOLUTION INTRAMUSCULAR; INTRAVENOUS; SUBCUTANEOUS at 10:02

## 2020-09-08 RX ADMIN — DIAZEPAM 5 MG: 5 TABLET ORAL at 13:51

## 2020-09-08 RX ADMIN — ROCURONIUM BROMIDE 10 MG: 10 INJECTION INTRAVENOUS at 09:09

## 2020-09-08 RX ADMIN — ACETAMINOPHEN 1000 MG: 500 TABLET ORAL at 23:25

## 2020-09-08 RX ADMIN — Medication 10 ML: at 23:33

## 2020-09-08 RX ADMIN — FAMOTIDINE 20 MG: 20 TABLET, FILM COATED ORAL at 17:38

## 2020-09-08 RX ADMIN — GLYCOPYRROLATE 0.4 MG: 0.2 INJECTION, SOLUTION INTRAMUSCULAR; INTRAVENOUS at 09:30

## 2020-09-08 RX ADMIN — WATER 2 G: 1 INJECTION INTRAMUSCULAR; INTRAVENOUS; SUBCUTANEOUS at 07:50

## 2020-09-08 RX ADMIN — ONDANSETRON HYDROCHLORIDE 4 MG: 2 INJECTION, SOLUTION INTRAMUSCULAR; INTRAVENOUS at 09:25

## 2020-09-08 RX ADMIN — CEFAZOLIN SODIUM 2 G: 1 INJECTION, POWDER, FOR SOLUTION INTRAMUSCULAR; INTRAVENOUS at 15:19

## 2020-09-08 NOTE — PROGRESS NOTES
Problem: Mobility Impaired (Adult and Pediatric)  Goal: *Acute Goals and Plan of Care (Insert Text)  Description: FUNCTIONAL STATUS PRIOR TO ADMISSION: Patient was independent and active without use of DME.    HOME SUPPORT PRIOR TO ADMISSION: The patient lived with wife but did not need any assistance. Physical Therapy Goals  Initiated 9/8/2020    1. Patient will move from supine to sit and sit to supine  in bed with modified independence within 4 days. 2. Patient will perform sit to stand with modified independence within 4 days. 3. Patient will ambulate with modified independence for 250 feet with the least restrictive device within 4 days. 4. Patient will ascend/descend 4 stairs with 1 handrail(s) with modified independence within 4 days. 5. Patient will verbalize and demonstrate understanding of spinal precautions (No bending, lifting greater than 5 lbs, or twisting; log-roll technique; frequent repositioning as instructed) within 4 days. Outcome: Progressing Towards Goal   PHYSICAL THERAPY EVALUATION  Patient: Malik Cotton (67 y.o. male)  Date: 9/8/2020  Primary Diagnosis: Spinal stenosis of lumbar region with neurogenic claudication [M48.062]  Procedure(s) (LRB):  C3-5 ANTERIOR CERVICAL DISCECTOMY WITH FUSION (N/A) Day of Surgery   Precautions:   Back, Fall    ASSESSMENT  Based on the objective data described below, the patient presents with decreased functional mobility from baseline level of function. Patient currently limited by drowsiness and impaired gait/balance. Patient is likely not far from his baseline but too lethargic to assess extensive gait and stairs. Will plan to see tomorrow for stairs training and ensure independence with mobility. Do not anticipate need for [facility] PT at [place]. Will continue to follow. Other factors to consider for discharge: none     Patient will benefit from skilled therapy intervention to address the above noted impairments.        PLAN :  Recommendations and Planned Interventions: bed mobility training, transfer training, gait training, therapeutic exercises, patient and family training/education, and therapeutic activities      Frequency/Duration: Patient will be followed by physical therapy:  twice daily to address goals. Recommendation for discharge: (in order for the patient to meet his/her long term goals)  No skilled physical therapy/ follow up rehabilitation needs identified at this time. IF patient discharges home will need the following DME: none         SUBJECTIVE:   Patient stated I'm still so tired.     OBJECTIVE DATA SUMMARY:   HISTORY:    Past Medical History:   Diagnosis Date    Hyperlipidemia      Past Surgical History:   Procedure Laterality Date    HX TONSILLECTOMY      5yrs       Personal factors and/or comorbidities impacting plan of care:     Home Situation  Home Environment: Private residence  # Steps to Enter: 5  Rails to Enter: Yes  Hand Rails : Bilateral  Wheelchair Ramp: No  One/Two Story Residence: One story  Living Alone: No  Support Systems: Spouse/Significant Other/Partner  Patient Expects to be Discharged to[de-identified] Private residence  Current DME Used/Available at Home: None  Tub or Shower Type: Shower    EXAMINATION/PRESENTATION/DECISION MAKING:   Critical Behavior:  Neurologic State: Sleeping, Drowsy, Eyes open spontaneously, Eyes open to voice  Orientation Level: Oriented to person, Oriented to place, Oriented to situation  Cognition: Appropriate for age attention/concentration, Follows commands     Hearing:   Auditory  Auditory Impairment: None  Hearing Aids/Status: Does not own    Range Of Motion:  AROM: Generally decreased, functional                       Strength:    Strength: Generally decreased, functional       Functional Mobility:  Bed Mobility:  Rolling: Supervision  Supine to Sit: Supervision  Sit to Supine: Supervision  Scooting: Supervision  Transfers:  Sit to Stand: Contact guard assistance  Stand to Sit: Contact guard assistance                       Balance:   Sitting: Intact  Standing: Intact  Ambulation/Gait Training:  Distance (ft): 25 Feet (ft)  Assistive Device: Gait belt  Ambulation - Level of Assistance: Contact guard assistance        Gait Abnormalities: Decreased step clearance        Base of Support: Widened     Speed/Elisabeth: Slow  Step Length: Left shortened;Right shortened       Pain Rating:  Reports some pain but does not rate    Activity Tolerance:   Fair and requires rest breaks  Please refer to the flowsheet for vital signs taken during this treatment. After treatment patient left in no apparent distress:   Supine in bed, Call bell within reach, and Side rails x 3    COMMUNICATION/EDUCATION:   The patients plan of care was discussed with: Physical therapist, Occupational therapist, and Registered nurse. Fall prevention education was provided and the patient/caregiver indicated understanding., Patient/family have participated as able in goal setting and plan of care. , and Patient/family agree to work toward stated goals and plan of care.     Thank you for this referral.  Smiley Levy, PT, DPT   Time Calculation: 21 mins

## 2020-09-08 NOTE — ANESTHESIA PREPROCEDURE EVALUATION
Relevant Problems   No relevant active problems       Anesthetic History   No history of anesthetic complications            Review of Systems / Medical History  Patient summary reviewed, nursing notes reviewed and pertinent labs reviewed    Pulmonary  Within defined limits                 Neuro/Psych             Comments: Cervical HNP/Cervical spine stenosis/Cervical Spondylosis/Cervicalgia/Cervical Radiculopathy Cardiovascular  Within defined limits            Hyperlipidemia    Exercise tolerance: >4 METS  Comments: ECG (8/31/20):  Normal sinus rhythm   Normal ECG   No previous ECGs available   GI/Hepatic/Renal  Within defined limits              Endo/Other        Obesity     Other Findings            Physical Exam    Airway  Mallampati: II  TM Distance: > 6 cm  Neck ROM: decreased range of motion   Mouth opening: Normal     Cardiovascular  Regular rate and rhythm,  S1 and S2 normal,  no murmur, click, rub, or gallop             Dental    Dentition: Upper partial plate and Lower partial plate     Pulmonary  Breath sounds clear to auscultation               Abdominal  GI exam deferred       Other Findings            Anesthetic Plan    ASA: 2  Anesthesia type: general    Monitoring Plan: BIS      Induction: Intravenous  Anesthetic plan and risks discussed with: Patient

## 2020-09-08 NOTE — PERIOP NOTES
Handoff Report from Operating Room to PACU    Report received from Farhana Shane RN and Laurelyn Opitz CRNA regarding Carmen Tolentino. .      Surgeon(s):  Truman Bass MD  And Procedure(s) (LRB):  C3-5 ANTERIOR CERVICAL DISCECTOMY WITH FUSION (N/A)  confirmed   with allergies, drains and dressings discussed. Anesthesia type, drugs, patient history, complications, estimated blood loss, vital signs, intake and output, and last pain medication, lines, reversal medications and temperature were reviewed.

## 2020-09-08 NOTE — BRIEF OP NOTE
Brief Postoperative Note    Patient: Annmarie Hannah. YOB: 1955  MRN: [id number]    Date of Procedure: 9/8/2020     Pre-Op Diagnosis: CERVICALGIA, CERVICAL RADICULOPATHY, CERVICAL SPONDYLOSIS WITHOUT MYELOPATHY, DISPLACEMENT OF CERVICAL INERVERTEBRAL DISC WITHOUT MYELOPATHY, SPINAL STENOSIS IN CERVICAL REGION, CERVICAL SPONDYLOSIS WITH MYELOPATHY    Post-Op Diagnosis: Same as preoperative diagnosis. Procedure(s):  C3-5 ANTERIOR CERVICAL DISCECTOMY WITH FUSION    Surgeon(s):  Manny Quiroz MD    Surgical Assistant: Physician Assistant: VIN Ahmadi    Anesthesia: Other     Estimated Blood Loss (mL): less than 50     Complications: None    Specimens: * No specimens in log *     Implants:   Implant Name Type Inv. Item Serial No.  Lot No. LRB No. Used Action   OSTEOAMP FIBERS 2.5CC   [id number] [facility] N/A N/A 1 Implanted   FLAT CERVICAL SPACER 14MM X 16MM X 6MM   N/A [facility] [id number] N/A 2 Implanted   SCR ANT CERV VA 4.5X16MM - SN/A  SCR ANT CERV VA 4.5X16MM N/A [facility] [id number] N/A 2 Implanted   4 X 16MM SELF-DRILL VARIABLE SCREW   N/A [facility] [id number] N/A 4 Implanted   32MM CERVICAL PLATE   N/A [employer] [facility] N/A N/A 1 Implanted       Drains:   Jayden Orta #1 09/08/20 Right; Anterior Neck (Active)       Findings: Stenosis    Electronically Signed by Tammy Sawyer MD on 9/8/2020 at 10:02 AM

## 2020-09-08 NOTE — PERIOP NOTES
Patient was tested for COVID on Friday. Patients results show COVID not detected. Patient stated he has quarantined at home since his test and denies any signs or symptoms.

## 2020-09-08 NOTE — ANESTHESIA POSTPROCEDURE EVALUATION
Procedure(s):  C3-5 ANTERIOR CERVICAL DISCECTOMY WITH FUSION. general    Anesthesia Post Evaluation        Patient location during evaluation: PACU  Note status: Adequate. Level of consciousness: responsive to verbal stimuli and sleepy but conscious  Pain management: satisfactory to patient  Airway patency: patent  Anesthetic complications: no  Cardiovascular status: acceptable  Respiratory status: acceptable  Hydration status: acceptable  Comments: +Post-Anesthesia Evaluation and Assessment    Patient: Amy Myles. MRN: [id number]  SSN: [id number]   YOB: 1955  Age: 76 y.o. Sex: male      Cardiovascular Function/Vital Signs    /77   Pulse 77   Temp 36.9 °C (98.4 °F)   Resp 11   Ht 5' 5\" (1.651 m)   Wt 87.8 kg (193 lb 9 oz)   SpO2 98%   BMI 32.21 kg/m²     Patient is status post Procedure(s):  C3-5 ANTERIOR CERVICAL DISCECTOMY WITH FUSION. Nausea/Vomiting: Controlled. Postoperative hydration reviewed and adequate. Pain:  Pain Scale 1: Numeric (0 - 10) (09/08/20 1045)  Pain Intensity 1: 7 (09/08/20 1045)   Managed. Neurological Status:   Neuro (WDL): Exceptions to WDL (09/08/20 1005)   At baseline. Mental Status and Level of Consciousness: Arousable. Pulmonary Status:   O2 Device: Nasal cannula (09/08/20 1045)   Adequate oxygenation and airway patent. Complications related to anesthesia: None    Post-anesthesia assessment completed. No concerns. Signed By: Trent Osman MD    9/8/2020  Post anesthesia nausea and vomiting:  controlled      INITIAL Post-op Vital signs:   Vitals Value Taken Time   /77 9/8/2020 11:15 AM   Temp 36.9 °C (98.4 °F) 9/8/2020  9:56 AM   Pulse 84 9/8/2020 11:19 AM   Resp 16 9/8/2020 11:19 AM   SpO2 99 % 9/8/2020 11:19 AM   Vitals shown include unvalidated device data.

## 2020-09-08 NOTE — OP NOTES
[facility]  OPERATIVE REPORT    Name:  Madhu Goode  MR#:  [id number]  :  1955  ACCOUNT #:  [de-identified]  DATE OF SERVICE:  2020    PREOPERATIVE DIAGNOSES:  1. Cervical spinal stenosis. 2.  Cervicalgia. 3.  Cervical radiculopathy. 4.  Cervical foraminal stenosis. 5.  Cervical myelopathy. POSTOPERATIVE DIAGNOSES:  1. Cervical spinal stenosis. 2.  Cervicalgia. 3.  Cervical radiculopathy. 4.  Cervical foraminal stenosis. 5.  Cervical myelopathy. PROCEDURE PERFORMED:  1. C3 through C5 anterior cervical discectomy and fusion x2.  2.  C3 through C5 anterior instrumentation with a plate that is completely separate from the interbody biomechanical device. 3.  Insertion of interbody biomechanical devices x2 again with a plate that is completely separate from the interbody biomechanical device. 4.  Use of operative microscope. 5.  Use of allograft bone for spine fusion. SURGEON:  Mara Nickerson MD    ASSISTANT:  VIN Lopez    ANESTHESIA:  General.    COMPLICATIONS:  None. SPECIMENS REMOVED:  None. IMPLANTS:  1. The Nanovis FortiCore interbody biomechanical device x2. Again, this device was completely independent from the anterior fixation. 2.  Use of Nanovis FortiBridge anterior cervical plate, which is completely independent from the interbody biomechanical device. ESTIMATED BLOOD LOSS:  25 mL. DRAINS:  X1.    ANTIBIOTICS:  IV Ancef. INDICATION FOR PROCEDURE:  The patient is a pleasant 69-year gentleman with cervical spinal stenosis, cervicalgia, and cervical radiculopathy in addition to myelopathy with myelomalacia. At this point, would like to proceed with surgical intervention. We have given him warnings about possible complications including, but not limited to, pain, scar, bleeding, infection, nonunion, damage to surrounding structures, death, paralysis, blindness, stroke. He understands and wants to proceed.     NARRATIVE OF THE PROCEDURE:  After informed consent was obtained and the operative site was properly marked, the patient was moved back to operating room and underwent general endotracheal anesthesia. He was positioned supine on the operating room table using the Toney table flat top. His arms were well-padded and tucked at the side. The knees were gently bent with pillows. Fluoroscopy was used to sera the level of the incision. I then proceeded to prep and drape in the usual manner. A time-out was obtained verifying that this was correct patient, the correct surgery, the correct site as well as that he had received IV antibiotics within 30 minutes of the incision. I then proceeded to perform a standard anterior approach to cervical spine exposing the area between C5 and C3. Once the area was exposed and hemostasis was obtained, I proceeded to place [place] pins at C3 and C4 and placed a self-retaining retractor under the longus colli muscle. Operative microscope was brought into the field and kept in place for the remainder of the procedure. I proceeded to use a 15-blade to perform a box annulotomy at C3-C4. I proceeded to perform the discectomy with a pituitary and a curette until we reached the PLL. Once the PLL was reached, I proceeded to remove it with a Kerrison #1, followed by Kerrison #2 fully decompressing the thecal sac. Once that area was fully decompressed, I proceeded to use a trial to determine the size for the implant and while the implant was packed with allograft bone, I made endplates coplanar with a Midas Paul. I proceeded then to insert my interbody biomechanical device packed with allograft bone for the anterior fusion. Once that was in place, I proceeded to move my retractors down to the C5-C4 level and repeated the procedure in the exact same manner.   Once both levels were completed, I proceeded to irrigate the wound with Irrisept, selected the independent plate and proceeded to connect the plate, and drilled for screws bilaterally at C3, C4, and C5. Once all these screws were in place, I proceeded to lock them with a final locking device and obtained final AP and lateral x-rays. Once this was completed, I proceeded to place a deep drain, closed the platysma with 2-0 Vicryl, subcutaneous with 3-0 Vicryl, skin with a 3-0 running Monocryl and Dermabond. Sterile dressing was applied. The patient was then awakened, transferred to PACU in stable condition. POSTOPERATIVE PLAN:  The patient is going to remain here overnight. We are going to give him SCDs and LAKESHA hose for DVT prophylaxis and Ancef for infection prophylaxis.         MD JAYJAY Stout/[id number]  D:  09/08/2020 13:28  T:  09/08/2020 15:55  JOB #:  [id number]

## 2020-09-08 NOTE — PROGRESS NOTES
Ortho / Neurosurgery NP Note    POD# 0  s/p C3-5 ANTERIOR CERVICAL DISCECTOMY WITH FUSION   On ortho floor, wife calling and expecting to pick him up to go home today    Pt resting in bed. C/o 9/10 pain in neck. States pain in neck and lower back pain pre-op. VSS Afebrile. Voiding status: due to void  Output (mL)  Urine Voided: 0 ml (09/08/20 1115)  Emesis: 0 mL (09/08/20 1115)  Stool: 0 ml (09/08/20 1115)  Last Bowel Movement Date: 09/07/20 (09/08/20 0700)  Blood: 0 ml (09/08/20 1115)  Other Output: 0 mL (09/08/20 1115)  Unmeasurable Output  Urine Occurrence(s): 0 (09/08/20 1115)  Stool Occurrence(s): 0 (09/08/20 1115)  Emesis Occurrence(s): 0 (09/08/20 1115)      Labs  Lab Results   Component Value Date/Time    HGB 14.3 08/31/2020 01:32 PM      Lab Results   Component Value Date/Time    INR 0.9 08/31/2020 01:32 PM        Recent Glucose Results: No results found for: GLU, GLUPOC, GLUCPOC      Body mass index is 32.21 kg/m². : A BMI > 30 is classified as obesity and > 40 is classified as morbid obesity. Prineo Dressing c.d.i, no bleeding  RUBÉN drain in place with minimal output  Able swallow and tolerate applesauce. No solids yet. Sensation and motor intact bilat UE 5/5  SCDs for mechanical DVT proph while in bed     PLAN:  1) OOB with nursing. Pt in too much pain to mobilze. Aware to call and ask for next dose of medication which is 3:30. 2) Due to void  4) Readniess for discharge:     [x] Vital Signs stable    [x] Hgb stable    [] + Voiding    [x] Wound intact, drainage minimal    [x] Tolerating PO intake     [] Cleared by PT (OT if applicable)     [] Stair training completed (if applicable)    [] Independent / Contact Guard Assist (household distance)     [] Bed mobility     [] Car transfers     [] ADLs    [] Adequate pain control on oral medication alone     Pain not controlled and needs to void. Likely not ready for d/c home tonight.     Plan remove drain in am and home tomorrow if above better.     Jahaira Bauman, MIRIAM  DNP, ACNP-BC, ONP-C

## 2020-09-08 NOTE — PERIOP NOTES
TRANSFER - OUT REPORT:    Verbal report given to Elisabet Toure RN(name) on [employer].  being transferred to [facility]) for routine post - op       Report consisted of patients Situation, Background, Assessment and   Recommendations(SBAR). Information from the following report(s) SBAR, OR Summary, Intake/Output, MAR and Cardiac Rhythm NSR was reviewed with the receiving nurse. Opportunity for questions and clarification was provided. Patient transported with:   O2 @ 2 liters  Registered Nurse     Wife notified or room assignment via cell phone. Denture container noted in belongings bag sent to the room with the patient.

## 2020-09-08 NOTE — PERIOP NOTES
Patient: Reuben Murphy. MRN: [id number]  SSN: [id number]   YOB: 1955  Age: 76 y.o. Sex: male     Patient is status post Procedure(s):  C3-5 ANTERIOR CERVICAL DISCECTOMY WITH FUSION. Surgeon(s) and Role:     Nandini Fleming MD - Primary    Local/Dose/Irrigation:  5ml Irrisept used for irrigation from sterile field (Lot# [id number], Exp: 06/30/2022)                  Peripheral IV 09/08/20 Right Hand (Active)   Site Assessment Clean, dry, & intact 09/08/20 0710   Phlebitis Assessment 0 09/08/20 0710   Infiltration Assessment 0 09/08/20 0710   Dressing Status Clean, dry, & intact 09/08/20 0710   Dressing Type 4 X 4;Transparent 09/08/20 0710   Hub Color/Line Status Pink 09/08/20 0710          Lazarus Bolder #1 09/08/20 Right; Anterior Neck (Active)      Airway - Endotracheal Tube 09/08/20 Oral (Active)   Line Franklyn Lips 09/08/20 0000                   Dressing/Packing:  Wound Neck Right dermabond prineo, biopatch, tegaderm, cervical collar-Dressing Type: Topical skin adhesive/glue(biopatch, tegaderm) (09/08/20 0804)    Splint/Cast: Wound Neck Right dermabond prineo, biopatch, tegaderm, cervical collar-Splint Type/Material: Cervical Collar]
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
## 2020-09-09 VITALS
DIASTOLIC BLOOD PRESSURE: 75 MMHG | BODY MASS INDEX: 32.25 KG/M2 | HEART RATE: 68 BPM | OXYGEN SATURATION: 95 % | TEMPERATURE: 98.3 F | HEIGHT: 65 IN | RESPIRATION RATE: 16 BRPM | WEIGHT: 193.56 LBS | SYSTOLIC BLOOD PRESSURE: 116 MMHG

## 2020-09-09 PROCEDURE — 74011250636 HC RX REV CODE- 250/636: Performed by: ORTHOPAEDIC SURGERY

## 2020-09-09 PROCEDURE — 77010033678 HC OXYGEN DAILY

## 2020-09-09 PROCEDURE — 99218 HC RM OBSERVATION: CPT

## 2020-09-09 PROCEDURE — 97116 GAIT TRAINING THERAPY: CPT

## 2020-09-09 PROCEDURE — 97530 THERAPEUTIC ACTIVITIES: CPT

## 2020-09-09 PROCEDURE — 74011250637 HC RX REV CODE- 250/637: Performed by: ORTHOPAEDIC SURGERY

## 2020-09-09 PROCEDURE — 94760 N-INVAS EAR/PLS OXIMETRY 1: CPT

## 2020-09-09 PROCEDURE — 97110 THERAPEUTIC EXERCISES: CPT

## 2020-09-09 RX ORDER — ACETAMINOPHEN 500 MG
1000 TABLET ORAL EVERY 6 HOURS
Qty: 112 TAB | Refills: 0 | Status: SHIPPED
Start: 2020-09-09 | End: 2020-09-23

## 2020-09-09 RX ORDER — AMOXICILLIN 250 MG
1 CAPSULE ORAL DAILY
Qty: 30 TAB | Refills: 0 | Status: SHIPPED | OUTPATIENT
Start: 2020-09-09

## 2020-09-09 RX ORDER — OXYCODONE HYDROCHLORIDE 5 MG/1
5-10 TABLET ORAL
Qty: 50 TAB | Refills: 0 | Status: SHIPPED | OUTPATIENT
Start: 2020-09-09 | End: 2020-09-23

## 2020-09-09 RX ORDER — POLYETHYLENE GLYCOL 3350 17 G/17G
17 POWDER, FOR SOLUTION ORAL
Qty: 15 PACKET | Refills: 0 | Status: SHIPPED | OUTPATIENT
Start: 2020-09-09 | End: 2020-09-24

## 2020-09-09 RX ADMIN — SODIUM CHLORIDE 125 ML/HR: 900 INJECTION, SOLUTION INTRAVENOUS at 00:53

## 2020-09-09 RX ADMIN — GABAPENTIN 100 MG: 100 CAPSULE ORAL at 09:43

## 2020-09-09 RX ADMIN — OXYCODONE HYDROCHLORIDE 10 MG: 5 TABLET ORAL at 09:44

## 2020-09-09 RX ADMIN — Medication 10 ML: at 06:47

## 2020-09-09 RX ADMIN — DOCUSATE SODIUM 50MG AND SENNOSIDES 8.6MG 1 TABLET: 8.6; 5 TABLET, FILM COATED ORAL at 09:44

## 2020-09-09 RX ADMIN — POLYETHYLENE GLYCOL 3350 17 G: 17 POWDER, FOR SOLUTION ORAL at 09:43

## 2020-09-09 RX ADMIN — HYDROMORPHONE HYDROCHLORIDE 1 MG: 1 INJECTION, SOLUTION INTRAMUSCULAR; INTRAVENOUS; SUBCUTANEOUS at 04:01

## 2020-09-09 RX ADMIN — FAMOTIDINE 20 MG: 20 TABLET, FILM COATED ORAL at 09:44

## 2020-09-09 RX ADMIN — OXYCODONE HYDROCHLORIDE 10 MG: 5 TABLET ORAL at 02:10

## 2020-09-09 RX ADMIN — OXYCODONE HYDROCHLORIDE 5 MG: 5 TABLET ORAL at 06:45

## 2020-09-09 RX ADMIN — ONDANSETRON 4 MG: 2 INJECTION INTRAMUSCULAR; INTRAVENOUS at 04:01

## 2020-09-09 NOTE — DISCHARGE SUMMARY
Spine Discharge Summary    Patient ID:  Sugar Mccauley  [id number]  male  76 y.o.  1955    Admit date: 9/8/2020    Discharge date: 9/9/2020    Admitting Physician: Jean Conrad MD     Consulting Physician(s):   Treatment Team: Attending Provider: Annie Fuentes MD; Utilization Review: Cameron Stock RN; Care Manager: Frank Calvert    Date of Surgery:   9/8/2020     Preoperative Diagnosis:  CERVICALGIA, CERVICAL RADICULOPATHY, CERVICAL SPONDYLOSIS WITHOUT MYELOPATHY, DISPLACEMENT OF CERVICAL INERVERTEBRAL [place] WITHOUT MYELOPATHY, SPINAL STENOSIS IN CERVICAL REGION, CERVICAL SPONDYLOSIS WITH MYELOPATHY    Postoperative Diagnosis:   CERVICALGIA, CERVICAL RADICULOPATHY, CERVICAL SPONDYLOSIS WITHOUT MYELOPATHY, DISPLACEMENT OF CERVICAL INERVERTEBRAL [address] [place] WITHOUT MYELOPATHY, SPINAL STENOSIS IN CERVICAL REGION, CERVICAL SPONDYLOSIS WITH MYELOPATHY    Procedure(s):  C3-5 ANTERIOR CERVICAL DISCECTOMY WITH FUSION     Anesthesia Type: Other     Surgeon: Annie Fuentes MD                            HPI:  Pt is a 76 y.o. male who has a history of CERVICALGIA, CERVICAL RADICULOPATHY, CERVICAL SPONDYLOSIS WITHOUT MYELOPATHY, DISPLACEMENT OF CERVICAL INERVERTEBRAL DISC WITHOUT MYELOPATHY, SPINAL STENOSIS IN CERVICAL REGION, CERVICAL SPONDYLOSIS WITH MYELOPATHY  with pain and limitations of activities of daily living who presents at this time for an ACDF following the failure of conservative management. PMH:   Past Medical History:   Diagnosis Date    Hyperlipidemia        Body mass index is 32.21 kg/m². : A BMI > 30 is classified as obesity and > 40 is classified as morbid obesity. Medications upon admission :   Prior to Admission Medications   Prescriptions Last Dose Informant Patient Reported?  Taking?   atorvastatin (LIPITOR) 40 mg tablet 9/7/2020 at 0900  Yes Yes   Sig: TAKE 1 TABLET BY MOUTH EVERY DAY   cyclobenzaprine (FLEXERIL) 10 mg tablet Not Taking at Unknown time  No No   Sig: Take 1 Tab by mouth three (3) times daily as needed for Muscle Spasm(s). Facility-Administered Medications: None        Allergies:  No Known Allergies     Hospital Course: The patient underwent surgery. Complications:  None; patient tolerated the procedure well. Was taken to the PACU in stable condition and then transferred to the ortho floor. Perioperative Antibiotics:  Ancef     Postoperative Pain Management:  Oxycodone      Postoperative transfusions:    Number of units banked PRBCs =   none     Post Op complications: none    Hemoglobin at discharge:    Lab Results   Component Value Date/Time    HGB 14.3 08/31/2020 01:32 PM    INR 0.9 08/31/2020 01:32 PM       Dressing was changed on POD # 1. Incision - clean, dry and intact. No significant erythema or swelling. Neurovascular exam found to be within normal limits. Wound appears to be healing without any evidence of infection. Pt had a HVAC drain that was removed on POD# 1. Physical Therapy started following surgery and participated in bed mobility, transfers and ambulation. Gait:  Gait  Base of Support: Widened  Speed/Elisabeth: Pace decreased (<100 feet/min)  Step Length: Right shortened, Left shortened  Gait Abnormalities: Decreased step clearance  Ambulation - Level of Assistance: Supervision  Distance (ft): 200 Feet (ft)  Assistive Device: Gait belt, Walker, rolling, Brace/Splint  Rail Use: Both  Stairs - Level of Assistance: Supervision  Number of Stairs Trained: 4  Interventions: Verbal cues            Interventions: Verbal cues      Discharged to: Home. Condition on Discharge:   stable    Discharge instructions:    - Take pain medications as prescribed  - Resume pre hospital diet      - Discharge activity: activity as tolerated  - Ambulate as tolerated  - Wound Care Keep wound clean and dry. See discharge instruction sheet.             -DISCHARGE MEDICATION LIST     Current Discharge Medication List      START taking these medications Details   acetaminophen (TYLENOL) 500 mg tablet Take 2 Tabs by mouth every six (6) hours for 14 days. Qty: 112 Tab, Refills: 0      oxyCODONE IR (ROXICODONE) 5 mg immediate release tablet Take 1-2 Tabs by mouth every four (4) hours as needed for Pain for up to 14 days. Max Daily Amount: 60 mg.  Qty: 50 Tab, Refills: 0    Associated Diagnoses: Cervical stenosis of spinal canal      polyethylene glycol (MIRALAX) 17 gram packet Take 1 Packet by mouth daily as needed (constipation) for up to 15 days. Qty: 15 Packet, Refills: 0      senna-docusate (PERICOLACE) 8.6-50 mg per tablet Take 1 Tab by mouth daily. Qty: 30 Tab, Refills: 0         CONTINUE these medications which have NOT CHANGED    Details   atorvastatin (LIPITOR) 40 mg tablet TAKE 1 TABLET BY MOUTH EVERY DAY      cyclobenzaprine (FLEXERIL) 10 mg tablet Take 1 Tab by mouth three (3) times daily as needed for Muscle Spasm(s). Qty: 20 Tab, Refills: 0          per medical continuation form      -Follow up in office in 2 weeks      Signed:  Scooby Morrissey.  Marianela Moreno, ACNP-BC, ONP-C  Orthopaedic Nurse Practitioner    9/9/2020  10:07 AM

## 2020-09-09 NOTE — PROGRESS NOTES
OT note:  OT orders received and chart was reviewed and spoke with NP and pt has discharge summary . Stopped by room to see if pt had any questions, and pt had already been discharged.

## 2020-09-09 NOTE — PROGRESS NOTES
Problem: Mobility Impaired (Adult and Pediatric)  Goal: *Acute Goals and Plan of Care (Insert Text)  Description: FUNCTIONAL STATUS PRIOR TO ADMISSION: Patient was independent and active without use of DME.    HOME SUPPORT PRIOR TO ADMISSION: The patient lived with wife but did not need any assistance. Physical Therapy Goals  Initiated 9/8/2020    1. Patient will move from supine to sit and sit to supine  in bed with modified independence within 4 days. 2. Patient will perform sit to stand with modified independence within 4 days. 3. Patient will ambulate with modified independence for 250 feet with the least restrictive device within 4 days. 4. Patient will ascend/descend 4 stairs with 1 handrail(s) with modified independence within 4 days. 5. Patient will verbalize and demonstrate understanding of spinal precautions (No bending, lifting greater than 5 lbs, or twisting; log-roll technique; frequent repositioning as instructed) within 4 days. Outcome: Progressing Towards Goal  PHYSICAL THERAPY TREATMENT  Patient: Adolm Skiff. (67 y.o. male)  Date: 9/9/2020  Diagnosis: Spinal stenosis of lumbar region with neurogenic claudication [M48.062]     Procedure(s) (LRB): C3-5 ANTERIOR CERVICAL DISCECTOMY WITH FUSION (N/A) 1 Day Post-Op    Precautions: Back, Fall No bending, no lifting greater than 5 lbs, no twisting, log-roll technique, repositioning every 20-30 min except when sleeping, brace when OOB     Chart, physical therapy assessment, plan of care and goals were reviewed. ASSESSMENT: Patient continues with skilled PT services and is progressing well towards goals, no LOB or SOB, did well with stair trng and car transfer, good motivation, garcia well with bed mob and transfers, no problems with ther-ex, vc's for safety, from PT standpoint pt is ready for D/C.     Current Level of Function Impacting Discharge (mobility/balance): supervision         PLAN : Patient continues to benefit from skilled intervention to address the above impairments. Continue treatment per established plan of care  to address goals. Recommendation for discharge: (in order for the patient to meet his/her long term goals) No skilled physical therapy/ follow up rehabilitation needs identified at this time. This discharge recommendation: Has been made in collaboration with the attending provider and/or case management    IF patient discharges home will need the following DME: none     OBJECTIVE DATA SUMMARY:     Critical Behavior:  Neurologic State: Alert, Appropriate for age  Orientation Level: Oriented X4, Appropriate for age  Cognition: Appropriate decision making, Appropriate for age attention/concentration, Appropriate safety awareness, Follows commands     Spinal diagnosis intervention:  The patient stated 3/3 back precautions when prompted. Reviewed all 3 back precautions, log roll technique, and sitting for 30 minutes at a time. Reviewed back brace application and wear schedule. Brace donned on arrival.     Functional Mobility Training:    Bed Mobility:  Log Rolling: Supervision  Supine to Sit: Supervision  Scooting: Supervision  Level of Assistance: Supervision  Interventions: Verbal cues    Transfers:  Sit to Stand: Supervision  Stand to Sit: Supervision  Bed to Chair: Stand-by assistance;Supervision  Interventions: Verbal cues  Level of Assistance: Supervision    Balance:  Sitting: Intact; Without support  Standing: Intact; Without support    Ambulation/Gait Training:  Distance (ft): 200 Feet (ft)  Assistive Device: Gait belt;Walker, rolling;Brace/Splint  Ambulation - Level of Assistance: Supervision  Gait Abnormalities: Decreased step clearance  Right Side Weight Bearing: Full  Left Side Weight Bearing: Full  Base of Support: Widened  Speed/Elisabeth: Pace decreased (<100 feet/min)  Step Length: Right shortened;Left shortened  Interventions: Verbal cues    Stairs:  Number of Stairs Trained: 4  Stairs - Level of Assistance: Supervision   Rail Use: Both    Therapeutic Exercises:   sitting  EXERCISE   Sets   Reps   Active Active Assist   Passive   Comments   Ankle pumps 1 10 [x] [] [] bilat   Heel raises 1 10 [x] [] [] \"   Toe tap 1 10 [x] [] [] \"   Knee ext 1 10 [x] [] [] \"   Hip flex 1 10 [x] [] [] \"   Abd & Add 1 10 [x] [] [] \"     Pain Rating: 3    Activity Tolerance: Good    After treatment patient left in no apparent distress: Sitting in chair and Call bell within reach    COMMUNICATION/COLLABORATION:   The patients plan of care was discussed with: Registered nurse.      Vaughn Hull PTA   Time Calculation: 30 mins

## 2020-09-09 NOTE — DISCHARGE INSTRUCTIONS
[facility]. Discharge Instruction Sheet: Anterior Cervical Fusion    DR. Tabatha Garcia    Pain control:  Typically, we will prescribe a narcotic usually 1-2 tabs every four hours is sufficient for the pain. Most patients need this only for the first few weeks. You should discontinue this as the pain decreases. You should not drive while taking any narcotic pain medications. Constipation  Pain medicines and anesthesia can be constipating-this can be prevented by gentle physical activity and drinking plenty of fluid. It should be treated with over-the-counter medications such as Miralax or suppositories, and/or Fleets enema. You should have a bowel movement at least every other day following surgery. Incision care   Keep this area clean and dry. Do not remove the dressing. DO NOT take a tub bath or go swimming until cleared by your doctor. DO NOT apply lotions, oils, or creams to incision. Cover the wound with an impermiable dressing to shower for then next 5 days, then no cover is needed. Wear cervical collar for comfort. If staples are in place, they should be removed about 14-20 days after surgery. To increase and promote healing:   Stop Smoking (or at least cut back on smoking).  Eat a well-balanced diet (high in protein and vitamin C)   If your appetite is poor, consider nutritional supplements like Ensure, Glucerna, or [place] Instant Breakfast.   If you are diabetic, controlling you blood sugars is very important to prevent infection and promote wound healing. Nutrition:   If you were on a supplement such as Ensure or Glucerna) while in the hospital, please continue using them with each meal for the next 30 days.    Eat a well-balanced diet - High in protein, high in vitamins and minerals, especially vitamin C and zinc.     Restrictions:  Limited bending at waist  Lift no more than 10 pounds    Warning signs :   Please call your physician IMMEDIATELY at [phone] if you have:   If you have throat soreness that worsens   If you have difficulty swallowing.  If you have any difficulty breathing   Bleeding from incision that is constant.  Change in mental status (unusual behavior or confusion)   If your incision develops redness or swelling   Change in wound drainage (increase in amount, color, or foul odor)   [place] over 101.5 degrees Fahrenheit    Headache that is not relieved with pain medication   Tenderness or redness in the calf of your leg    Emergency: CALL 911 if you have:   Any Difficulty Breathing or Shortness of breath   Difficulty Swallowing   Chest pain   Localized chest pain when coughing or taking a deep breath    Munroe Virgilio. Follow-up  Please call Dr. Green Mediate office for a follow up appointment in 2-3 weeks at [phone]. You can return to work when cleared by a physician. During normal business hours you may reach Dr. Wilfred De La Torre' team directly at [phone] if you have concerns or questions.

## 2020-09-09 NOTE — PROGRESS NOTES
Bedside and Verbal shift change report given to Bibiana (oncoming nurse) by Fady (offgoing nurse). Report included the following information SBAR, Kardex, Intake/Output, MAR and Med Rec Status.

## 2020-09-09 NOTE — PROGRESS NOTES
ORTHO POST OP SPINE PROGRESS NOTE    2020  Admit Date: 2020  Admit Diagnosis: Spinal stenosis of lumbar region with neurogenic claudication [M48.062]  Procedure: Procedure(s):  C3-5 ANTERIOR CERVICAL DISCECTOMY WITH FUSION  Post Op day: 1 Day Post-Op    Subjective:     Carmen Tolentino. is a patient who has complaints Pain in the neck. Improved upper extremity symptoms status post C3 through C5 ACDF. No current complaints of low back pain. Tolerating p.o. and able to void. Review of Systems: Pertinent items are noted in HPI. Objective:     PT/OT:   Distance Ambulated:           Time Ambulated (min):        Ambulation Response: Activity Response: Tolerated well  Assistive Device:              Assistive Device: Fall prevention device    Vital Signs:    Blood pressure 112/67, pulse 80, temperature 98.1 °F (36.7 °C), resp. rate 16, height 5' 5\" (1.651 m), weight 87.8 kg (193 lb 9 oz), SpO2 93 %. Temp (24hrs), Av °F (36.7 °C), Min:97.8 °F (36.6 °C), Max:98.4 °F (36.9 °C)      701 -  190  In: -   Out: 10 [Drains:10]  1901 -  07  In: 1200 [I.V.:1200]  Out: 1125 [Urine:800]    LAB:    No results for input(s): HGB, HGBEXT, WBC, PLT, PLTEXT, HGBEXT, PLTEXT in the last 72 hours. Wound/Drain Assessment:  Drain:      Dressing:     Physical Exam:  Neurological: no deficit  Incision clean, dry, and intact  5/5 strength bilateral upper extremities    Assessment:      Patient Active Problem List   Diagnosis Code    Spinal stenosis of lumbar region with neurogenic claudication M48.062       Plan:     Continue PT/OT/Rehab  Discontinue: IV  Consult: PT  and OT    Discharge To: Home.   Today

## 2020-09-09 NOTE — PROGRESS NOTES
CM acknowledge inpatient admission. CM will follow pt for consults and any needs prior to discharge. If any concerns or questions arise pertaining to pt discharge, please contact unit CM. Oral and Written notification given to patient and/or caregiver informing them that they are currently an Outpatient receiving care in our facility. Outpatient services include Observation Services.      Zuleima Holcomb, [address]

## 2022-03-18 PROBLEM — M48.062 SPINAL STENOSIS OF LUMBAR REGION WITH NEUROGENIC CLAUDICATION: Status: ACTIVE | Noted: 2020-09-08

## 2023-05-20 RX ORDER — CYCLOBENZAPRINE HCL 10 MG
10 TABLET ORAL 3 TIMES DAILY PRN
COMMUNITY
Start: 2020-05-27

## 2023-05-20 RX ORDER — ATORVASTATIN CALCIUM 40 MG/1
1 TABLET, FILM COATED ORAL DAILY
COMMUNITY
Start: 2020-04-11

## 2023-05-20 RX ORDER — AMOXICILLIN 250 MG
1 CAPSULE ORAL DAILY
COMMUNITY
Start: 2020-09-09

## 2025-04-24 ENCOUNTER — TRANSCRIBE ORDERS (OUTPATIENT)
Facility: HOSPITAL | Age: 70
End: 2025-04-24

## 2025-04-24 DIAGNOSIS — M47.814 THORACIC SPONDYLOSIS: ICD-10-CM
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
## 2025-04-24 DIAGNOSIS — M51.34 DDD (DEGENERATIVE DISC DISEASE), THORACIC: ICD-10-CM

## 2025-04-24 DIAGNOSIS — M54.6 PAIN IN THORACIC SPINE: Primary | ICD-10-CM

## 2025-04-24 DIAGNOSIS — M54.50 LUMBAR SPINE PAIN: ICD-10-CM

## 2025-04-24 DIAGNOSIS — M51.360 DEGENERATION OF INTERVERTEBRAL DISC OF LUMBAR REGION WITH DISCOGENIC BACK PAIN: ICD-10-CM

## 2025-05-18 ENCOUNTER — HOSPITAL ENCOUNTER (OUTPATIENT)
Facility: HOSPITAL | Age: 70
Discharge: HOME | End: 2025-05-21
Attending: ORTHOPAEDIC SURGERY
Payer: MEDICARE

## 2025-05-18 DIAGNOSIS — M51.34 DDD (DEGENERATIVE DISC DISEASE), THORACIC: ICD-10-CM
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
## 2025-05-18 DIAGNOSIS — M47.814 THORACIC SPONDYLOSIS: ICD-10-CM

## 2025-05-18 DIAGNOSIS — M54.6 PAIN IN THORACIC SPINE: ICD-10-CM

## 2025-05-18 DIAGNOSIS — M54.50 LUMBAR SPINE PAIN: ICD-10-CM

## 2025-05-18 DIAGNOSIS — M51.360 DEGENERATION OF INTERVERTEBRAL DISC OF LUMBAR REGION WITH DISCOGENIC BACK PAIN: ICD-10-CM

## 2025-05-18 PROCEDURE — 72146 MRI CHEST SPINE W/O DYE: CPT

## 2025-05-18 PROCEDURE — 72148 MRI LUMBAR SPINE W/O DYE: CPT

## (undated) DEVICE — SOLUTION IV 1000ML 0.9% SOD CHL

## (undated) DEVICE — 3M™ TEGADERM™ TRANSPARENT FILM DRESSING FRAME STYLE, 1626W, 4 IN X 4-3/4 IN (10 CM X 12 CM), 50/CT 4CT/CASE: Brand: 3M™ TEGADERM™

## (undated) DEVICE — PREP CHLORAPRP 10.5ML ORG STRL --

## (undated) DEVICE — 4-PORT MANIFOLD: Brand: NEPTUNE 2

## (undated) DEVICE — BONE WAX WHITE: Brand: BONE WAX WHITE

## (undated) DEVICE — SPONGE GZ W4XL4IN COT 12 PLY TYP VII WVN C FLD DSGN

## (undated) DEVICE — STERILE POLYISOPRENE POWDER-FREE SURGICAL GLOVES: Brand: PROTEXIS

## (undated) DEVICE — 3M™ STERI-DRAPE™ INSTRUMENT POUCH 1018: Brand: STERI-DRAPE™

## (undated) DEVICE — DRAPE,LAPAROTOMY,PCH,STERILE: Brand: MEDLINE

## (undated) DEVICE — COVER,TABLE,60X90,STERILE: Brand: MEDLINE

## (undated) DEVICE — SUTURE VCRL SZ 2-0 L18IN ABSRB UD CT-1 L36MM 1/2 CIR J839D

## (undated) DEVICE — DRAIN SURG 10FR L1/8IN DIA3.2MM SIL CHN RND HUBLESS FULL

## (undated) DEVICE — HALTER TRACTION HD W/ TRI COTTON LINING FOAM LTX

## (undated) DEVICE — PENCIL SMK EVAC L10FT DIA95MM TBNG NONSTICK W ADPT TO 22MM

## (undated) DEVICE — AEGIS 1" DISK 4MM HOLE, PEEL OPEN: Brand: MEDLINE

## (undated) DEVICE — SOLUTION IRRIG 1000ML H2O STRL BLT

## (undated) DEVICE — Z DISCONTINUED USE 2717541 SUTURE STRATAFIX SZ 3-0 L30CM NONABSORBABLE UD L26MM FS 3/8

## (undated) DEVICE — DRAPE MICSCP 65MM LENS FOR LEICA VARI-LENS2

## (undated) DEVICE — CASPAR DISTR PIN12MMSTER: Brand: AESCULAP

## (undated) DEVICE — COVER,MAYO STAND,STERILE: Brand: MEDLINE

## (undated) DEVICE — 3.0MM PRECISION NEURO (MATCH HEAD)

## (undated) DEVICE — SUTURE PERMAHAND SZ 2-0 L30IN NONABSORBABLE BLK SILK W/O A305H

## (undated) DEVICE — HANDLE LT SNAP ON ULT DURABLE LENS FOR TRUMPF ALC DISPOSABLE

## (undated) DEVICE — BLADE ES L4IN INSUL EDGE

## (undated) DEVICE — 16MM DRILL BIT

## (undated) DEVICE — TUBING, SUCTION, 1/4" X 10', STRAIGHT: Brand: MEDLINE

## (undated) DEVICE — INSULATED BLADE ELECTRODE: Brand: EDGE

## (undated) DEVICE — Device

## (undated) DEVICE — SYSTEM SKIN CLSR 22CM DERMBND PRINEO

## (undated) DEVICE — Z CONVERTED USE 2107985 COVER FLROSCP W36XL28IN 4 SIDE ADH

## (undated) DEVICE — STERILE POLYISOPRENE POWDER-FREE SURGICAL GLOVES WITH EMOLLIENT COATING: Brand: PROTEXIS

## (undated) DEVICE — SPONGE: SPECIALTY PEANUT XR 100/CS: Brand: MEDICAL ACTION INDUSTRIES

## (undated) DEVICE — MAGNETIC INSTRUMENT PAD 10" X 16"; MEDIUM; DISPOSABLE: Brand: CARDINAL HEALTH

## (undated) DEVICE — LAMINECTOMY RICHMOND-LF: Brand: MEDLINE INDUSTRIES, INC.

## (undated) DEVICE — 450 ML BOTTLE OF 0.05% CHLORHEXIDINE GLUCONATE IN 99.95% STERILE WATER FOR IRRIGATION, USP AND APPLICATOR.: Brand: IRRISEPT ANTIMICROBIAL WOUND LAVAGE

## (undated) DEVICE — FLOSEAL HEMOSTATIC MATRIX, 5 ML: Brand: FLOSEAL

## (undated) DEVICE — GOWN,SIRUS,NONRNF,SETINSLV,2XL,18/CS: Brand: MEDLINE

## (undated) DEVICE — BIPOLAR FORCEPS CORD: Brand: VALLEYLAB

## (undated) DEVICE — SUTURE VCRL UD BR CT 3-0 18IN CT1 J838D

## (undated) DEVICE — GARMENT,MEDLINE,DVT,INT,CALF,MED, GEN2: Brand: MEDLINE

## (undated) DEVICE — INFECTION CONTROL KIT SYS